# Patient Record
Sex: FEMALE
[De-identification: names, ages, dates, MRNs, and addresses within clinical notes are randomized per-mention and may not be internally consistent; named-entity substitution may affect disease eponyms.]

---

## 2024-08-28 ENCOUNTER — APPOINTMENT (OUTPATIENT)
Dept: INTERNAL MEDICINE | Facility: OTHER | Age: 69
End: 2024-08-28

## 2024-09-04 ENCOUNTER — OFFICE VISIT (OUTPATIENT)
Dept: INTERNAL MEDICINE | Facility: OTHER | Age: 69
End: 2024-09-04
Payer: MEDICARE

## 2024-09-04 VITALS
OXYGEN SATURATION: 97 % | HEART RATE: 71 BPM | DIASTOLIC BLOOD PRESSURE: 70 MMHG | BODY MASS INDEX: 23.32 KG/M2 | WEIGHT: 140 LBS | SYSTOLIC BLOOD PRESSURE: 130 MMHG | HEIGHT: 65 IN | TEMPERATURE: 96.8 F

## 2024-09-04 DIAGNOSIS — K21.9 GASTROESOPHAGEAL REFLUX DISEASE WITHOUT ESOPHAGITIS: ICD-10-CM

## 2024-09-04 DIAGNOSIS — R73.03 PREDIABETES: ICD-10-CM

## 2024-09-04 DIAGNOSIS — T78.40XS ALLERGY, SEQUELA: ICD-10-CM

## 2024-09-04 DIAGNOSIS — E03.9 HYPOTHYROIDISM, UNSPECIFIED TYPE: ICD-10-CM

## 2024-09-04 DIAGNOSIS — I10 HYPERTENSION, UNSPECIFIED TYPE: ICD-10-CM

## 2024-09-04 DIAGNOSIS — E78.5 DYSLIPIDEMIA: ICD-10-CM

## 2024-09-04 DIAGNOSIS — Z76.89 ENCOUNTER TO ESTABLISH CARE: ICD-10-CM

## 2024-09-04 DIAGNOSIS — G62.9 NEUROPATHY: ICD-10-CM

## 2024-09-04 DIAGNOSIS — F17.210 SMOKING GREATER THAN 20 PACK YEARS: Chronic | ICD-10-CM

## 2024-09-04 DIAGNOSIS — Z12.31 BREAST CANCER SCREENING BY MAMMOGRAM: ICD-10-CM

## 2024-09-04 DIAGNOSIS — F10.10 ALCOHOL ABUSE, DAILY USE: ICD-10-CM

## 2024-09-04 PROBLEM — T78.40XA ALLERGY: Status: ACTIVE | Noted: 2024-09-04

## 2024-09-04 PROCEDURE — 3078F DIAST BP <80 MM HG: CPT | Mod: GC

## 2024-09-04 PROCEDURE — 3075F SYST BP GE 130 - 139MM HG: CPT | Mod: GC

## 2024-09-04 PROCEDURE — 99204 OFFICE O/P NEW MOD 45 MIN: CPT | Mod: GC

## 2024-09-04 RX ORDER — DULOXETIN HYDROCHLORIDE 60 MG/1
60 CAPSULE, DELAYED RELEASE ORAL DAILY
COMMUNITY
End: 2024-09-10 | Stop reason: SDUPTHER

## 2024-09-04 RX ORDER — AMLODIPINE BESYLATE 5 MG/1
5 TABLET ORAL DAILY
COMMUNITY

## 2024-09-04 RX ORDER — LORATADINE 10 MG/1
10 TABLET ORAL DAILY
COMMUNITY

## 2024-09-04 RX ORDER — LOSARTAN POTASSIUM 100 MG/1
100 TABLET ORAL DAILY
COMMUNITY

## 2024-09-04 RX ORDER — CARVEDILOL 25 MG/1
25 TABLET ORAL 2 TIMES DAILY WITH MEALS
COMMUNITY

## 2024-09-04 RX ORDER — LEVOTHYROXINE SODIUM 25 UG/1
25 TABLET ORAL
COMMUNITY

## 2024-09-04 RX ORDER — ATORVASTATIN CALCIUM 20 MG/1
20 TABLET, FILM COATED ORAL NIGHTLY
COMMUNITY

## 2024-09-04 RX ORDER — PREGABALIN 50 MG/1
50 CAPSULE ORAL 2 TIMES DAILY
COMMUNITY

## 2024-09-04 RX ORDER — LEVOTHYROXINE SODIUM 200 UG/1
200 TABLET ORAL
COMMUNITY

## 2024-09-04 ASSESSMENT — PATIENT HEALTH QUESTIONNAIRE - PHQ9: CLINICAL INTERPRETATION OF PHQ2 SCORE: 0

## 2024-09-04 NOTE — PATIENT INSTRUCTIONS
Measure Blood pressure, write down, bring back in next visit  - Lab work  - Mammogram  - Waiting for medical record transfer from [facility] PCP

## 2024-09-04 NOTE — PROGRESS NOTES
Teaching Physician Attestation      Level of Participation    I have personally interviewed and examined the patient.  In addition, I discussed with the resident physician the patient's history, exam, assessment and plan in detail.  Topics listed in my addendum were the focus of the visit.  Healthcare maintenance was not addressed this visit unless listed as a topic in my addendum.  I agree with the plan as written along with the following additions/modifications:    New Patient    PMH: hypothyroidism, fam hx breast cancer (mother  at age 38), depression per chart, alcohol use, tobacco use, htn, hidradentits suppurativa previously followed by derm (reports completely resolved), gerd, neck LAD (neg US , patient reports stable, was told was benign),predaibetes, neuropathy likely 2/2 alcohol use at least in part    Peripheral neuropathy, likely secondary to alcohol use, may also have component of prior trauma to foot on right side, may also have a genetic component  Patient reports drinking 25 alcoholic beverages in a typical week.  She reports she has been evaluated in the past, reports multiple family members also have peripheral neuropathy.  Thus, may possibly be a genetic component.  Symptoms are predominantly in the feet.  She reports they are worse on the right side in the setting of a prior trauma to the right foot, although are bilateral.  Significantly improved on Lyrica.  Was previously prediabetic, does note drinking significant amounts of alcohol, she reports prior to lab work but is not sure if B12 was checked.  Does have history of hypothyroidism    Plan  -Check vitamin B12, A1c, also requesting records.  Continue Lyrica.  Checking TSH to monitor her thyroid status to prep for follow-up.  Assuming all values are normal, is almost certainly related in part due to her alcohol use.  Counseled the patient on this connection, and offered to assist in reducing her alcohol use at any time if she is  interested.  -Needs dedicated foot exam with tests of protective sensation at follow-up.  Called to follow-up after the visit, left a voicemail emphasizing the importance of daily foot exams and to call with any wounds that do not rapidly heal.    Nocturnal leg cramps  -Patient reports bilateral foot and calf cramping that is almost exclusively at night, at times is severe and prevents sleeping, rarely occurring during the day.  On exam calves appear grossly normal.    Plan  -Check electrolytes  -Counseled on hydration, minimum of 2 L/day of water.  Discussed how alcohol is a diuretic and can actually be counterproductive in hydration  -Calf stretching exercises discussed, handout will be delivered by Dr. Jerry.  -Follow-up in 1 month.  If electrolytes are normal and no improvement, can discuss other options    Tobacco use and high risk alcohol use  -Counseled on the risks related to alcohol use and tobacco use, offered to help patient quit at any time if she is interested.  Cautioned on driving while drinking.    History of breast cancer in mother  -Pts mother  at age 38 of breast cancer.  Her last mammogram was more than 2 years ago, has no current symptoms.  -Screening mammogram ordered, will follow-up after results.    Patient reports history of depression, no SI or HI, needs dedicated follow-up.      Rtc 1 month pcr.

## 2024-09-05 NOTE — PROGRESS NOTES
Chief Complaint   Patient presents with    Establish Care       HISTORY OF PRESENT ILLNESS: Patient is a 68 y.o. female with past medical history of hypothyroid, hypertension, dyslipidemia, allergy, neuropathy, GERD, depression and anxiety established patient who presents today for establishing primary care.  She and her  just moved to [place] from [facility] in  after half-way.  Requested to transfer for medical records from [facility] care and she agreed to it. Pending previous lab report, imagining report.    1. Hypothyroidism, unspecified type  Been taking levothyroxine 225 mcg, previously monitor and followed by her PCP, no endo specialist. Denied fatigue, weight gain, trouble tolerating cold, joint and muscle pain, dry skin or dry, thinning hair.     2. Hypertension, unspecified type  3. Dyslipidemia  Reported of losing 47 lb in 6 months after modifying her nutrition, diet, eating less carb, less sugar, more protein, less fast food, more home cook meals.But still cannot reduce smoking and alcohol. Drink 5-6 beers a day and smokes 1 pack a day.  She stated her BP has been well controlled, Home SBP ranging 120-130 mmHg. At today visit, initially was 144/76 and recheck manual BP was 130/70.   Been taking losartan, carvedilol, amlodipine for HTN and well tolerated  Taking Atorvastatin 20mg for dyslipidemia and well tolerated    4. Peripheral Neuropathy  Probably d/t alcohol abuse vs. B12 def ? vs. Electrolyte d/o?  5. Leg cramp  Had peripheral neuropathy ken in her legs for more than many years and relieved by pregabalin. Stated her other family members also have it and she thinks it might be genetic.   She has leg cramp at most night that wakes her up. Cramps were relieved after drinking pickle juice energy drink.    6. Prediabetes  She stated her last HbA1c was >6%    7. Breast cancer screening by mammogram  Her mother  from breast ca at age of 38. She stated her last  What Type Of Note Output Would You Prefer (Optional)?: Bullet Format Hpi Title: Evaluation of Skin Lesions mammogram 4 years ago was normal but she does not think she has breast ca gene so she does not recheck again but willing to undergo mammogram for now.      Past Medical History:   Diagnosis Date    Allergy     Dyslipidemia     GERD (gastroesophageal reflux disease)     Hypertension     Hypothyroid     Neuropathy        Patient Active Problem List    Diagnosis Date Noted    Allergy 09/04/2024       Penicillins    Current Outpatient Medications   Medication Sig Dispense Refill    levothyroxine (SYNTHROID) 25 MCG Tab Take 25 mcg by mouth every morning on an empty stomach.      loratadine (CLARITIN) 10 MG Tab Take 10 mg by mouth every day.      losartan (COZAAR) 100 MG Tab Take 100 mg by mouth every day.      esomeprazole (NEXIUM) 20 MG capsule Take 20 mg by mouth every morning before breakfast.      levothyroxine (SYNTHROID) 200 MCG Tab Take 200 mcg by mouth every morning on an empty stomach.      DULoxetine (CYMBALTA) 60 MG Cap DR Particles delayed-release capsule Take 60 mg by mouth every day.      carvedilol (COREG) 25 MG Tab Take 25 mg by mouth 2 times a day with meals.      NON SPECIFIED Take  by mouth every evening. Pickle juice for night cramps (lower body cramps)      atorvastatin (LIPITOR) 20 MG Tab Take 20 mg by mouth every evening.      pregabalin (LYRICA) 50 MG capsule Take 50 mg by mouth 2 times a day.      amLODIPine (NORVASC) 5 MG Tab Take 5 mg by mouth every day.       No current facility-administered medications for this visit.       Social History     Tobacco Use    Smoking status: Some Days     Current packs/day: 1.00     Average packs/day: 1 pack/day for 24.7 years (24.7 ttl pk-yrs)     Types: Cigarettes     Start date: 2000    Smokeless tobacco: Never   Vaping Use    Vaping status: Never Used   Substance Use Topics    Alcohol use: Yes     Alcohol/week: 15.0 oz     Types: 25 Cans of beer per week    Drug use: Not Currently       Family History   Problem Relation Age of Onset    Breast Cancer Mother  "38    Heart Disease Father 63    Other Sister 43        Hep C    Heart Failure Brother 62    Hypertension Brother     Drug abuse Brother          Review of Systems   Review of Systems   All other systems reviewed and are negative.      Exam:  /70 (BP Location: Left arm, Patient Position: Sitting)   Pulse 71   Temp 36 °C (96.8 °F) (Temporal)   Ht 1.651 m (5' 5\")   Wt 63.5 kg (140 lb)   SpO2 97%  Body mass index is 23.3 kg/m².    Constitutional:  Not in acute distress, well appearing.  HEENT:   NC/AT  Cardiovascular: Regular rate and rhythm. No murmurs or gallops.      Lungs:   Clear to auscultation bilaterally. No wheezes or crackles. No respiratory distress.  Abdomen: Not distended, soft, not tender. No guarding or rigidity. No masses.  Extremities:  No cyanosis/clubbing/edema. No obvious deformities.  Skin:  Warm and dry.  No visible rashes.  Neurologic: Alert & oriented x 3, CN II-XII grossly intact, strength and sensation grossly intact.  No focal deficits noted.  Psychiatric:  Affect normal, mood normal, judgment normal.    Assessment/Plan:     1. Hypothyroidism, unspecified type  - C/w levothyroxine 225mg  - TSH WITH REFLEX TO FT4; Future    2. Hypertension, unspecified type  Measure home BP, write down and report back in next visit  - Comp Metabolic Panel; Future  - MAGNESIUM; Future    3. Dyslipidemia  - C/w atorvastatin 20mg  - Lipid Profile; Future    4. Neuropathy  Counseled on hydration, at least of 2 L/day of water. Discussed how alcohol can cause neuropathy.  Calf stretching exercises discussed, recommend to look Youtube calf muscle exercises.  - VITAMIN B12; Future  - MAGNESIUM; Future    6. Prediabetes  - HEMOGLOBIN A1C; Future    7. Breast cancer screening by mammogram  - MA-SCREENING MAMMO BILAT W/TOMOSYNTHESIS W/CAD; Future    Discussed about smoking and alcohol abuse  - Counseled on risks and complications of long term abuses, offered to help to quit anytime if she is ready. She " How Severe Are Your Spot(S)?: moderate Have Your Spot(S) Been Treated In The Past?: has not been treated currently declined to talk about it.    All imaging results and lab results and consult notes are reviewed at this visit.  Followup: Return in about 5 weeks (around 10/10/2024), or if symptoms worsen or fail to improve.    Please note that this dictation was created using voice recognition software. I have made every reasonable attempt to correct obvious errors, but I expect that there are errors of grammar and possibly content that I did not discover before finalizing the note.        Nafisa Jerry MD  Internal Medicine PGY1      Additional History: Fbse

## 2024-09-09 ENCOUNTER — TELEPHONE (OUTPATIENT)
Dept: INTERNAL MEDICINE | Facility: OTHER | Age: 69
End: 2024-09-09
Payer: COMMERCIAL

## 2024-09-09 DIAGNOSIS — F32.0 CURRENT MILD EPISODE OF MAJOR DEPRESSIVE DISORDER, UNSPECIFIED WHETHER RECURRENT (HCC): ICD-10-CM

## 2024-09-10 RX ORDER — DULOXETIN HYDROCHLORIDE 60 MG/1
60 CAPSULE, DELAYED RELEASE ORAL DAILY
Qty: 30 CAPSULE | Refills: 1 | Status: SHIPPED | OUTPATIENT
Start: 2024-09-10

## 2024-09-10 NOTE — TELEPHONE ENCOUNTER
Received a voicemail from patient, requesting her medications.   Routing to Dr. Vargas as well, as he precepted appointment.     I called Loyda back to let her know that I have informed Dr. Vargas and Dr. Jerry of her message.

## 2024-09-11 NOTE — TELEPHONE ENCOUNTER
Phone Number Called: [phone]    Call outcome: Spoke to patient regarding message below.    Message: Informed patient that Dr. Jerry sent in her Duloxetine refill yesterday.   Closing encounter now.

## 2024-09-19 ENCOUNTER — HOSPITAL ENCOUNTER (OUTPATIENT)
Dept: RADIOLOGY | Facility: MEDICAL CENTER | Age: 69
End: 2024-09-19
Payer: COMMERCIAL

## 2024-09-30 ENCOUNTER — TELEPHONE (OUTPATIENT)
Dept: INTERNAL MEDICINE | Facility: OTHER | Age: 69
End: 2024-09-30
Payer: COMMERCIAL

## 2024-10-03 DIAGNOSIS — G62.9 NEUROPATHY: ICD-10-CM

## 2024-10-03 RX ORDER — PREGABALIN 50 MG/1
50 CAPSULE ORAL 2 TIMES DAILY
Qty: 90 CAPSULE | Status: CANCELLED | OUTPATIENT
Start: 2024-10-03

## 2024-10-03 NOTE — TELEPHONE ENCOUNTER
Received request via: Patient    Was the patient seen in the last year in this department? Yes    Does the patient have an active prescription (recently filled or refills available) for medication(s) requested?  no    Ph[facility] Name: [facility]    Does the patient have [facility] Plus and need 100-day supply? (This applies to ALL medications) Patient does not have SCP

## 2024-10-04 RX ORDER — LOSARTAN POTASSIUM 100 MG/1
100 TABLET ORAL DAILY
Qty: 30 TABLET | Refills: 2 | Status: SHIPPED | OUTPATIENT
Start: 2024-10-04

## 2024-10-04 RX ORDER — ATORVASTATIN CALCIUM 20 MG/1
20 TABLET, FILM COATED ORAL NIGHTLY
Qty: 30 TABLET | Refills: 2 | Status: SHIPPED | OUTPATIENT
Start: 2024-10-04

## 2024-10-04 RX ORDER — PREGABALIN 50 MG/1
50 CAPSULE ORAL 2 TIMES DAILY
Qty: 60 CAPSULE | Refills: 2 | Status: CANCELLED | OUTPATIENT
Start: 2024-10-04

## 2024-10-05 ENCOUNTER — HOSPITAL ENCOUNTER (OUTPATIENT)
Dept: LAB | Facility: MEDICAL CENTER | Age: 69
End: 2024-10-05
Payer: MEDICARE

## 2024-10-05 ENCOUNTER — APPOINTMENT (OUTPATIENT)
Dept: LAB | Facility: MEDICAL CENTER | Age: 69
End: 2024-10-05
Payer: MEDICARE

## 2024-10-05 DIAGNOSIS — E78.5 DYSLIPIDEMIA: ICD-10-CM

## 2024-10-05 DIAGNOSIS — I10 HYPERTENSION, UNSPECIFIED TYPE: ICD-10-CM

## 2024-10-05 DIAGNOSIS — E03.9 HYPOTHYROIDISM, UNSPECIFIED TYPE: ICD-10-CM

## 2024-10-05 DIAGNOSIS — R73.03 PREDIABETES: ICD-10-CM

## 2024-10-05 DIAGNOSIS — G62.9 NEUROPATHY: ICD-10-CM

## 2024-10-05 LAB
ALBUMIN SERPL BCP-MCNC: 4.3 G/DL (ref 3.2–4.9)
ALBUMIN/GLOB SERPL: 1.3 G/DL
ALP SERPL-CCNC: 83 U/L (ref 30–99)
ALT SERPL-CCNC: 42 U/L (ref 2–50)
ANION GAP SERPL CALC-SCNC: 14 MMOL/L (ref 7–16)
AST SERPL-CCNC: 40 U/L (ref 12–45)
BILIRUB SERPL-MCNC: 0.6 MG/DL (ref 0.1–1.5)
BUN SERPL-MCNC: 8 MG/DL (ref 8–22)
CALCIUM ALBUM COR SERPL-MCNC: 10 MG/DL (ref 8.5–10.5)
CALCIUM SERPL-MCNC: 10.2 MG/DL (ref 8.5–10.5)
CHLORIDE SERPL-SCNC: 100 MMOL/L (ref 96–112)
CHOLEST SERPL-MCNC: 149 MG/DL (ref 100–199)
CO2 SERPL-SCNC: 23 MMOL/L (ref 20–33)
CREAT SERPL-MCNC: 0.4 MG/DL (ref 0.5–1.4)
EST. AVERAGE GLUCOSE BLD GHB EST-MCNC: 105 MG/DL
GFR SERPLBLD CREATININE-BSD FMLA CKD-EPI: 107 ML/MIN/1.73 M 2
GLOBULIN SER CALC-MCNC: 3.4 G/DL (ref 1.9–3.5)
GLUCOSE SERPL-MCNC: 103 MG/DL (ref 65–99)
HBA1C MFR BLD: 5.3 % (ref 4–5.6)
HDLC SERPL-MCNC: 48 MG/DL
LDLC SERPL CALC-MCNC: 79 MG/DL
MAGNESIUM SERPL-MCNC: 1.7 MG/DL (ref 1.5–2.5)
POTASSIUM SERPL-SCNC: 4.1 MMOL/L (ref 3.6–5.5)
PROT SERPL-MCNC: 7.7 G/DL (ref 6–8.2)
SODIUM SERPL-SCNC: 137 MMOL/L (ref 135–145)
TRIGL SERPL-MCNC: 110 MG/DL (ref 0–149)

## 2024-10-05 PROCEDURE — 82607 VITAMIN B-12: CPT

## 2024-10-05 PROCEDURE — 84439 ASSAY OF FREE THYROXINE: CPT

## 2024-10-05 PROCEDURE — 83036 HEMOGLOBIN GLYCOSYLATED A1C: CPT | Mod: GA

## 2024-10-05 PROCEDURE — 36415 COLL VENOUS BLD VENIPUNCTURE: CPT

## 2024-10-05 PROCEDURE — 80053 COMPREHEN METABOLIC PANEL: CPT

## 2024-10-05 PROCEDURE — 80061 LIPID PANEL: CPT

## 2024-10-05 PROCEDURE — 83735 ASSAY OF MAGNESIUM: CPT

## 2024-10-05 PROCEDURE — 84443 ASSAY THYROID STIM HORMONE: CPT

## 2024-10-06 LAB
T4 FREE SERPL-MCNC: 1.81 NG/DL (ref 0.93–1.7)
TSH SERPL DL<=0.005 MIU/L-ACNC: <0.005 UIU/ML (ref 0.38–5.33)
VIT B12 SERPL-MCNC: 227 PG/ML (ref 211–911)

## 2024-10-07 RX ORDER — PREGABALIN 50 MG/1
50 CAPSULE ORAL 2 TIMES DAILY
Qty: 60 CAPSULE | Refills: 2 | Status: CANCELLED | OUTPATIENT
Start: 2024-10-07

## 2024-10-08 ENCOUNTER — HOSPITAL ENCOUNTER (OUTPATIENT)
Dept: RADIOLOGY | Facility: MEDICAL CENTER | Age: 69
End: 2024-10-08
Payer: MEDICARE

## 2024-10-08 ENCOUNTER — TELEPHONE (OUTPATIENT)
Dept: INTERNAL MEDICINE | Facility: OTHER | Age: 69
End: 2024-10-08

## 2024-10-08 DIAGNOSIS — Z12.31 BREAST CANCER SCREENING BY MAMMOGRAM: ICD-10-CM

## 2024-10-08 PROCEDURE — 77067 SCR MAMMO BI INCL CAD: CPT

## 2024-10-09 ENCOUNTER — APPOINTMENT (OUTPATIENT)
Dept: INTERNAL MEDICINE | Facility: OTHER | Age: 69
End: 2024-10-09
Payer: MEDICARE

## 2024-10-09 VITALS
HEART RATE: 75 BPM | BODY MASS INDEX: 23.06 KG/M2 | TEMPERATURE: 98.2 F | HEIGHT: 65 IN | OXYGEN SATURATION: 97 % | SYSTOLIC BLOOD PRESSURE: 122 MMHG | DIASTOLIC BLOOD PRESSURE: 66 MMHG | WEIGHT: 138.4 LBS

## 2024-10-09 DIAGNOSIS — Z13.820 SCREENING FOR OSTEOPOROSIS: ICD-10-CM

## 2024-10-09 DIAGNOSIS — F32.0 CURRENT MILD EPISODE OF MAJOR DEPRESSIVE DISORDER, UNSPECIFIED WHETHER RECURRENT (HCC): ICD-10-CM

## 2024-10-09 DIAGNOSIS — E03.9 HYPOTHYROIDISM, UNSPECIFIED TYPE: ICD-10-CM

## 2024-10-09 DIAGNOSIS — M81.8 OTHER OSTEOPOROSIS WITHOUT CURRENT PATHOLOGICAL FRACTURE: ICD-10-CM

## 2024-10-09 DIAGNOSIS — L98.9 SKIN LESION OF FACE: ICD-10-CM

## 2024-10-09 DIAGNOSIS — E05.90 HYPERTHYROIDISM: ICD-10-CM

## 2024-10-09 DIAGNOSIS — Z23 FLU VACCINE NEED: ICD-10-CM

## 2024-10-09 DIAGNOSIS — Z12.11 COLON CANCER SCREENING: ICD-10-CM

## 2024-10-09 DIAGNOSIS — Z85.828 HISTORY OF BASAL CELL CARCINOMA (BCC): ICD-10-CM

## 2024-10-09 PROBLEM — C44.91 BASAL CELL CARCINOMA (BCC): Status: ACTIVE | Noted: 2024-10-09

## 2024-10-09 PROCEDURE — G0008 ADMIN INFLUENZA VIRUS VAC: HCPCS | Mod: GC

## 2024-10-09 PROCEDURE — 90662 IIV NO PRSV INCREASED AG IM: CPT | Mod: GC

## 2024-10-09 PROCEDURE — 99214 OFFICE O/P EST MOD 30 MIN: CPT | Mod: 25,GC

## 2024-10-09 RX ORDER — DULOXETIN HYDROCHLORIDE 60 MG/1
60 CAPSULE, DELAYED RELEASE ORAL DAILY
Qty: 90 CAPSULE | Refills: 1 | Status: SHIPPED | OUTPATIENT
Start: 2024-10-09

## 2024-10-10 ENCOUNTER — TELEPHONE (OUTPATIENT)
Dept: INTERNAL MEDICINE | Facility: OTHER | Age: 69
End: 2024-10-10
Payer: COMMERCIAL

## 2024-10-10 DIAGNOSIS — G62.9 NEUROPATHY: ICD-10-CM

## 2024-10-17 ENCOUNTER — APPOINTMENT (OUTPATIENT)
Dept: RADIOLOGY | Facility: MEDICAL CENTER | Age: 69
End: 2024-10-17
Payer: MEDICARE

## 2024-10-21 ENCOUNTER — HOSPITAL ENCOUNTER (OUTPATIENT)
Dept: RADIOLOGY | Facility: MEDICAL CENTER | Age: 69
End: 2024-10-21
Payer: MEDICARE

## 2024-10-21 DIAGNOSIS — E05.90 HYPERTHYROIDISM: ICD-10-CM

## 2024-10-21 DIAGNOSIS — M81.8 OTHER OSTEOPOROSIS WITHOUT CURRENT PATHOLOGICAL FRACTURE: ICD-10-CM

## 2024-10-21 DIAGNOSIS — E03.9 HYPOTHYROIDISM, UNSPECIFIED TYPE: ICD-10-CM

## 2024-10-21 DIAGNOSIS — Z13.820 SCREENING FOR OSTEOPOROSIS: ICD-10-CM

## 2024-10-21 PROCEDURE — 77080 DXA BONE DENSITY AXIAL: CPT

## 2024-11-21 RX ORDER — LEVOTHYROXINE SODIUM 200 UG/1
200 TABLET ORAL
Qty: 90 TABLET | Refills: 2 | Status: SHIPPED | OUTPATIENT
Start: 2024-11-21

## 2024-11-21 NOTE — TELEPHONE ENCOUNTER
Received request via: Pharmacy    Was the patient seen in the last year in this department? Yes    Does the patient have an active prescription (recently filled or refills available) for medication(s) requested? No    Pharmacy Name: [facility]    Does the patient have [facility]-way Plus and need 100-day supply? (This applies to ALL medications) Patient does not have SCP

## 2024-12-24 ENCOUNTER — HOSPITAL ENCOUNTER (OUTPATIENT)
Dept: LAB | Facility: MEDICAL CENTER | Age: 69
End: 2024-12-24
Payer: MEDICARE

## 2024-12-24 DIAGNOSIS — E03.9 HYPOTHYROIDISM, UNSPECIFIED TYPE: ICD-10-CM

## 2024-12-24 LAB
ALBUMIN SERPL BCP-MCNC: 4.3 G/DL (ref 3.2–4.9)
ALBUMIN/GLOB SERPL: 1.3 G/DL
ALP SERPL-CCNC: 74 U/L (ref 30–99)
ALT SERPL-CCNC: 31 U/L (ref 2–50)
ANION GAP SERPL CALC-SCNC: 11 MMOL/L (ref 7–16)
AST SERPL-CCNC: 29 U/L (ref 12–45)
BILIRUB SERPL-MCNC: 0.3 MG/DL (ref 0.1–1.5)
BUN SERPL-MCNC: 11 MG/DL (ref 8–22)
CALCIUM ALBUM COR SERPL-MCNC: 8.9 MG/DL (ref 8.5–10.5)
CALCIUM SERPL-MCNC: 9.1 MG/DL (ref 8.5–10.5)
CHLORIDE SERPL-SCNC: 98 MMOL/L (ref 96–112)
CO2 SERPL-SCNC: 26 MMOL/L (ref 20–33)
CREAT SERPL-MCNC: 0.68 MG/DL (ref 0.5–1.4)
GFR SERPLBLD CREATININE-BSD FMLA CKD-EPI: 94 ML/MIN/1.73 M 2
GLOBULIN SER CALC-MCNC: 3.3 G/DL (ref 1.9–3.5)
GLUCOSE SERPL-MCNC: 78 MG/DL (ref 65–99)
POTASSIUM SERPL-SCNC: 5.1 MMOL/L (ref 3.6–5.5)
PROT SERPL-MCNC: 7.6 G/DL (ref 6–8.2)
SODIUM SERPL-SCNC: 135 MMOL/L (ref 135–145)
T4 FREE SERPL-MCNC: 1.33 NG/DL (ref 0.93–1.7)
TSH SERPL-ACNC: 0.18 UIU/ML (ref 0.35–5.5)

## 2024-12-24 PROCEDURE — 36415 COLL VENOUS BLD VENIPUNCTURE: CPT

## 2024-12-24 PROCEDURE — 84443 ASSAY THYROID STIM HORMONE: CPT

## 2024-12-24 PROCEDURE — 84439 ASSAY OF FREE THYROXINE: CPT

## 2024-12-24 PROCEDURE — 80053 COMPREHEN METABOLIC PANEL: CPT

## 2025-01-03 ENCOUNTER — OFFICE VISIT (OUTPATIENT)
Dept: INTERNAL MEDICINE | Facility: OTHER | Age: 70
End: 2025-01-03
Payer: MEDICARE

## 2025-01-03 VITALS
DIASTOLIC BLOOD PRESSURE: 59 MMHG | WEIGHT: 142 LBS | BODY MASS INDEX: 23.66 KG/M2 | OXYGEN SATURATION: 97 % | HEART RATE: 77 BPM | SYSTOLIC BLOOD PRESSURE: 115 MMHG | TEMPERATURE: 98 F | HEIGHT: 65 IN

## 2025-01-03 DIAGNOSIS — E03.9 HYPOTHYROIDISM, UNSPECIFIED TYPE: ICD-10-CM

## 2025-01-03 DIAGNOSIS — G47.00 INSOMNIA, UNSPECIFIED TYPE: ICD-10-CM

## 2025-01-03 PROCEDURE — 99214 OFFICE O/P EST MOD 30 MIN: CPT | Mod: GC

## 2025-01-03 PROCEDURE — 3074F SYST BP LT 130 MM HG: CPT | Mod: GC

## 2025-01-03 PROCEDURE — 3078F DIAST BP <80 MM HG: CPT | Mod: GC

## 2025-01-03 RX ORDER — LEVOTHYROXINE SODIUM 175 UG/1
175 TABLET ORAL
Qty: 90 TABLET | Refills: 1 | Status: SHIPPED | OUTPATIENT
Start: 2025-01-03

## 2025-01-03 RX ORDER — MULTIVITAMIN
CAPSULE ORAL
COMMUNITY

## 2025-01-03 ASSESSMENT — PATIENT HEALTH QUESTIONNAIRE - PHQ9: CLINICAL INTERPRETATION OF PHQ2 SCORE: 0

## 2025-01-03 NOTE — PROGRESS NOTES
Teaching Physician Attestation      Level of Participation    I have personally interviewed and examined the patient.  In addition, I discussed with the resident physician the patient's history, exam, assessment and plan in detail.  Topics listed in my addendum were the focus of the visit.  Healthcare maintenance was not addressed this visit unless listed as a topic in my addendum.  I agree with the plan as written along with the following additions/modifications:    Hypothyroidism  -some hair changes, otherwise no sx  -tsh 0.177, improved after last downtitration  -decrease to 175 mcg levothyroxine, tsh 6 weeks, f/u 7 weeks    sleep issues multifactorial secondary to stimulant use (tobacco), ruminating thoughts, and potentially nighttime urination  -Anxiety resources handout given with highlighted resources  -Discussed link between tobacco use and issues sleeping, patient considering quitting, in interim suggested last cigarette earlier in the day  -Drink last fluid earlier in the day  -Follow-up 6 weeks    Creatinine elevation  -No symptoms.  Encouraged increased hydration 2 to 3 L daily, repeat CMP.  Is on losartan.    Pcr.

## 2025-01-04 NOTE — PROGRESS NOTES
Established Patient    Patient Care Team:  Nafisa Jerry M.D. as PCP - General (Internal Medicine)    Loyda Sheehan is a 69 y.o. female with past medical history of hypothyroid, hypertension, dyslipidemia, allergy, neuropathy, GERD, depression and anxiety who presents today with the following Chief Complaint(s): Follow up for Diagnoses of Hypothyroidism, unspecified type and Insomnia, unspecified type were pertinent to this visit.    HPI:  1. Hypothyroidism, unspecified type  Her TSH is 0.177 but improved after lowering dose to 200mcg.  Denied any hyperthyroid symptoms.  Except thinning of hair which has been progressively worsened since August. This hair thinning timeline is coincide with her TSH level getting worsen.   Recommended her to try hair serum rosemary oil for now.   Denied increased heart rate, anxiety, constipation or diarrhea.    2. Insomnia, unspecified type  She stated she has difficulty initiating to sleep. Also when she woke up to go to bathroom, she has hard time to sleep back. So, she has to take nap in afternoon. She also stated her brain does not stop thinking about stuffs. Denied anxiety. She does smoke at least 4 cigarettes in evening.      ROS:     General: No fevers, chills, night sweats, weight loss or gain  HEENT: No hearing changes, vision changes, eye pain, ear pain, nasal discharge, sore throat  Neck: No swelling in neck  Pulmonary: No shortness of breath, cough, sputum, or hemoptysis  Cardiovascular: No chest pain, palpitations, or LE swelling  GI: No nausea, vomiting, diarrhea, constipation, abdominal pain, hematochezia or melena  : No dysuria or frequency  Neuro: No focal weakness, no general weakness, no headaches, no lightheadedness, no dizziness  Psych: No anxiety or depression    Past Medical History:   Diagnosis Date    Allergy     Dyslipidemia     GERD (gastroesophageal reflux disease)     Hypertension     Hypothyroid     Neuropathy      Social History     Tobacco Use     "Smoking status: Some Days     Current packs/day: 1.00     Average packs/day: 1 pack/day for 25.0 years (25.0 ttl pk-yrs)     Types: Cigarettes     Start date: 2000    Smokeless tobacco: Never    Tobacco comments:     1 pack daily    Vaping Use    Vaping status: Never Used   Substance Use Topics    Alcohol use: Yes     Alcohol/week: 15.0 oz     Types: 25 Cans of beer per week    Drug use: Not Currently     Current Outpatient Medications   Medication Sig Dispense Refill    Multiple Vitamin (MULTIVITAMIN) capsule Take  by mouth.      CALCIUM MAGNESIUM ZINC PO Take 10,000 mg by mouth every day. Brand _ Qunol      levothyroxine (SYNTHROID) 175 MCG Tab Take 1 Tablet by mouth every morning on an empty stomach. 90 Tablet 1    pregabalin (LYRICA) 50 MG capsule Take 1 Capsule by mouth 2 times a day for 90 days. 180 Capsule 0    DULoxetine (CYMBALTA) 60 MG Cap DR Particles delayed-release capsule Take 1 Capsule by mouth every day. 90 Capsule 1    losartan (COZAAR) 100 MG Tab Take 1 Tablet by mouth every day. 30 Tablet 2    loratadine (CLARITIN) 10 MG Tab Take 10 mg by mouth every day.      esomeprazole (NEXIUM) 20 MG capsule Take 20 mg by mouth every morning before breakfast.      carvedilol (COREG) 25 MG Tab Take 25 mg by mouth 2 times a day with meals.      amLODIPine (NORVASC) 5 MG Tab Take 5 mg by mouth every day.      atorvastatin (LIPITOR) 20 MG Tab Take 1 Tablet by mouth every evening. (Patient not taking: Reported on 1/3/2025) 30 Tablet 2     No current facility-administered medications for this visit.       Physical Exam:  /59 (BP Location: Left arm, Patient Position: Sitting, BP Cuff Size: Adult)   Pulse 77   Temp 36.7 °C (98 °F) (Temporal)   Ht 1.651 m (5' 5\")   Wt 64.4 kg (142 lb)   SpO2 97%   BMI 23.63 kg/m²   General: Well developed, well nourished female, in no distress.  HEENT: NC/AT, PERRL, EOMI, no scleral icterus or conjunctival pallor, fair dentition/denture in, no nasal discharge or oral " erythema or exudates.   Neck: Supple, No cervical or supraclavicular LAD  CV:RRR, no murmurs gallops or Rubs, no JVD  Pulm: LCAB, no crackles, rales, rhonchi, or wheezing  GI: Normal bowel sounds, abdomen soft, nontender, nondistended to deep or light palpation in all 4 quadrants, no HSM.  MSK: Radial and dorsalis pedis pulses 2+ and equal bilaterally, respectively.  Strength 5 out of 5 in upper and lower extremities.  No lower extremity edema  Neuro: Patient is alert and oriented x3, no focal deficits  Psych: Appropriate mood and affect       Assessment and Plan:   1. Hypothyroidism, unspecified type  Recommended to lower her synthroid to 175 mcg.  For her hair thinning problem, recommended to use rosemary oil. She does not have any dandruff or dry flaky scalp. This hair thinning could be due to unbalance thyroid problems. Recommend her to improve her thyroid level and monitor this. If hair thinning get worsen, she is recommended to see dermatologist in future.    - levothyroxine (SYNTHROID) 175 MCG Tab; Take 1 Tablet by mouth every morning on an empty stomach.  Dispense: 90 Tablet; Refill: 1  - Comp Metabolic Panel; Future  - TSH WITH REFLEX TO FT4; Future    2. Insomnia, unspecified type  This insomnia is probably due to smoking near bedtime. Educate her not to smoke after 3pm. She deferred to smoking cessation plan.      No problem-specific Assessment & Plan notes found for this encounter.       Return in about 7 weeks (around 2/21/2025).    Patient Instructions   -Do lab 1 week before next appointment      Nafisa Jerry M.D. PGY 1        This note was created using voice recognition software.  While every attempt is made to ensure accuracy of transcription, occasionally errors occur.

## 2025-01-13 ENCOUNTER — OFFICE VISIT (OUTPATIENT)
Dept: DERMATOLOGY | Facility: IMAGING CENTER | Age: 70
End: 2025-01-13
Payer: MEDICARE

## 2025-01-13 DIAGNOSIS — D18.01 CHERRY ANGIOMA: ICD-10-CM

## 2025-01-13 DIAGNOSIS — L82.1 SEBORRHEIC KERATOSES: ICD-10-CM

## 2025-01-13 DIAGNOSIS — Z12.83 SKIN CANCER SCREENING: ICD-10-CM

## 2025-01-13 DIAGNOSIS — D22.9 MULTIPLE BENIGN NEVI: ICD-10-CM

## 2025-01-13 DIAGNOSIS — D48.5 NEOPLASM OF UNCERTAIN BEHAVIOR OF SKIN: ICD-10-CM

## 2025-01-13 DIAGNOSIS — L81.4 LENTIGO: ICD-10-CM

## 2025-01-13 DIAGNOSIS — L57.0 ACTINIC KERATOSIS: ICD-10-CM

## 2025-01-13 PROBLEM — C44.91 BASAL CELL CARCINOMA (BCC): Status: RESOLVED | Noted: 2024-10-09 | Resolved: 2025-01-13

## 2025-01-13 PROCEDURE — 17003 DESTRUCT PREMALG LES 2-14: CPT | Performed by: STUDENT IN AN ORGANIZED HEALTH CARE EDUCATION/TRAINING PROGRAM

## 2025-01-13 PROCEDURE — 99203 OFFICE O/P NEW LOW 30 MIN: CPT | Mod: 25 | Performed by: STUDENT IN AN ORGANIZED HEALTH CARE EDUCATION/TRAINING PROGRAM

## 2025-01-13 PROCEDURE — 17000 DESTRUCT PREMALG LESION: CPT | Mod: 59 | Performed by: STUDENT IN AN ORGANIZED HEALTH CARE EDUCATION/TRAINING PROGRAM

## 2025-01-13 PROCEDURE — 11102 TANGNTL BX SKIN SINGLE LES: CPT | Performed by: STUDENT IN AN ORGANIZED HEALTH CARE EDUCATION/TRAINING PROGRAM

## 2025-01-13 NOTE — PROGRESS NOTES
[facility] DERMATOLOGY CLINIC NOTE    Chief Complaint   Patient presents with    Establish Care     Skin exam   Skin lesion of concern on nose x 2 years         HPI:    Loyda Sheehan is a 69 y.o. female here for evaluation of above, GUSTAVO.     Today notes following skin lesions of concern:     1) Skin growth on: nasal tip   Duration: ~5 years   Associated symptoms: flaking, redness   Prior treatments: none     No other symptomatic (itching, painful, burning) or changing lesions.       Dermatology History:        - Prior history of skin cancer: BCC right neck > 10 years ago        Review of Systems: No fevers, chill. Pertinent positives and negatives above.       Medications, Medical History, Surgical History, Family History & Allergies:  Reviewed in the chart, relevant history noted above.         PHYSICAL EXAM, ASSESSMENT, & PLAN (per problem):   A total body skin exam was performed including the following areas: head (including face), neck, chest, abdomen, groin/buttocks (excluding genitals), back, bilateral upper extremities, and bilateral lower extremities with the following pertinent findings in assessment/plan.      Neoplasm of uncertain significance, left thigh  Exam: pearly papule with crisp telangiectasias, ddx includes BCC vs other      - discussed options today, proceed with biopsy. Further treatment may be recommended     Shave Biopsy Procedure Note:   Risks, benefits and alternatives of procedure discussed, verbal consent obtained for photo (see chart) and informed consent obtained for procedure. Time out completed. Area of biopsy prepped with alcohol. Anesthesia with 1% lidocaine with epinephrine administered intradermally with 30 gauge needle. Shave biopsy of the site performed. Hemostasis achieved with pressure and aluminum chloride. Vaseline applied to wound with bandage. Patient tolerated procedure well and there were no complications. Wound care was discussed. Instructed to call clinic if patient  experiences any complications such as post-operative bleeding, infection.        Actinic Keratosis n=2  Exam: Gritty pink papules on let nasal tip, left upper cutaneous lip      Pre-cancerous nature of lesions and potential for progression to SCC if left untreated over time discussed.  Patient elects to proceed with LN2 destructive treatment today of largest/thickest lesions. Risks (including, but not limited to: hypo or hyperpigmentation, redness, blister, scar, recurrence) and benefits of cryotherapy discussed. PROCEDURE NOTE: 2 cryotherapy cycles x 10 seconds applied to 2 lesion/s in location as specified above. Aftercare instructions discussed.   If lesions do not heal or continue to grow, crust, bleed, etc, please call office sooner for re-evaluation.           Benign appearing melanocytic nevi   Exam: medium brown macules scattered on upper back, trunk consistent with junctional nevi    Discussed nevi are benign appearing on exam, symmetrical and regular pigment network on dermoscopy   ABCDEs of melanoma discussed, call office for sooner evaluation for concerning changes/growth      Lentigos / lentigines   Exam: scattered light to medium brown macules over photodistributed areas of face, upper shoulders, arms     benign, reassurance   ABCDEs of melanoma discussed       Seborrheic keratosis   Exam: scattered tan to brown verrucous papules and plaques on trunk and extremities     benign, reassurance       Cherry Angiomas   Exam: scattered 1-3mm bright red macules and thin papules on trunk and extremities     benign, reassurance       Skin Cancer Prevention Counseling   Advise regular sun protection/sunscreen use, SPF 30 or greater with broad spectrum coverage need for reapplication every  minutes.   Recommend broad brimmed hats, UPF sun protective clothing when outdoors for extended periods of time   Recommend self checks at home,  ABCDEs of melanoma discussed           Follow up: in 1 year for GUSTAVO, sooner  as needed       Rox Blake MD   [facility]

## 2025-01-16 ENCOUNTER — TELEPHONE (OUTPATIENT)
Dept: DERMATOLOGY | Facility: IMAGING CENTER | Age: 70
End: 2025-01-16
Payer: MEDICARE

## 2025-01-16 DIAGNOSIS — G62.9 NEUROPATHY: ICD-10-CM

## 2025-01-16 NOTE — TELEPHONE ENCOUNTER
Received request via: Pharmacy    Was the patient seen in the last year in this department? Yes    Does the patient have an active prescription (recently filled or refills available) for medication(s) requested? No    Pharmacy Name: [id number] - [place], [place] - [address] [[id number]]     Does the patient have [facility] Plus and need 100-day supply? (This applies to ALL medications) Patient does not have SCP

## 2025-01-16 NOTE — TELEPHONE ENCOUNTER
Called patient to inform of results.     A.) left thigh - basal cell carcinoma, infiltrative type     Discussed risks and benefits of treatment options including WLE, ED&C and topical chemotherapies. Discussed treatment rates are very high with WLE and ED&C, are slightly lower for topical chemotherapies like Efudex, however still overall very successful. After discussion, agreeable to proceed with wide local excision.     Will schedule in clinic for procedure. Thankful for call and had no further questions.

## 2025-01-19 RX ORDER — PREGABALIN 50 MG/1
50 CAPSULE ORAL 2 TIMES DAILY
Qty: 180 CAPSULE | Refills: 0 | Status: SHIPPED | OUTPATIENT
Start: 2025-01-19 | End: 2025-04-19

## 2025-01-22 ENCOUNTER — OFFICE VISIT (OUTPATIENT)
Dept: DERMATOLOGY | Facility: IMAGING CENTER | Age: 70
End: 2025-01-22
Payer: MEDICARE

## 2025-01-22 VITALS
WEIGHT: 139 LBS | TEMPERATURE: 99 F | BODY MASS INDEX: 21.82 KG/M2 | DIASTOLIC BLOOD PRESSURE: 78 MMHG | SYSTOLIC BLOOD PRESSURE: 126 MMHG | HEIGHT: 67 IN

## 2025-01-22 DIAGNOSIS — C44.91 INFILTRATIVE BASAL CELL CARCINOMA: ICD-10-CM

## 2025-01-22 DIAGNOSIS — C44.719 BASAL CELL CARCINOMA (BCC) OF SKIN OF LEFT LOWER EXTREMITY INCLUDING HIP: ICD-10-CM

## 2025-01-22 PROCEDURE — 11602 EXC TR-EXT MAL+MARG 1.1-2 CM: CPT | Performed by: STUDENT IN AN ORGANIZED HEALTH CARE EDUCATION/TRAINING PROGRAM

## 2025-01-22 PROCEDURE — 3078F DIAST BP <80 MM HG: CPT | Performed by: STUDENT IN AN ORGANIZED HEALTH CARE EDUCATION/TRAINING PROGRAM

## 2025-01-22 PROCEDURE — 12032 INTMD RPR S/A/T/EXT 2.6-7.5: CPT | Performed by: STUDENT IN AN ORGANIZED HEALTH CARE EDUCATION/TRAINING PROGRAM

## 2025-01-22 PROCEDURE — 3074F SYST BP LT 130 MM HG: CPT | Performed by: STUDENT IN AN ORGANIZED HEALTH CARE EDUCATION/TRAINING PROGRAM

## 2025-01-22 NOTE — PROGRESS NOTES
"PROCEDURE NOTE:    MALIGNANT LESION - WIDE LOCAL EXCISION    After patient received diagnosis of basal cell carcinoma, nodular type, further management was discussed, including wide local excision vs radiation therapy vs curettage & electrodesiccation (C&D) vs topical creams vs cryotherapy. Patient opted for wide local excision. Risks, benefits and alternatives of procedure, including, but not limited to scar, bleeding, pain, infection, nerve damage, recurrence of tumor, failed surgery, and need for further surgery, were discussed and written informed consent obtained. Correct site was verified by patient and myself, and verbal time out completed.     Allergies reviewed: Yes  Pacemaker/defibrillator: No  Artificial joints: No  Antibiotics given: No  Blood thinners/anticoagulants: No    Pre-op diagnosis: BCC, infiltrative type   Post-op diagnosis: Same  Site:left mid thigh  Pre-op size: 0.7cm + 5mm margin = 1.7cm  Antibiotics: no    /78   Temp 37.2 °C (99 °F)   Ht 1.702 m (5' 7\")   Wt 63 kg (139 lb)     Procedure: Area of surgery was prepped with alcohol, marked with 5 mm margins, and with sterile marking pen. Anesthesia with 1% lidocaine with epinephrine administered with 30 gauge needle. The area was again cleaned with povidine-iodine swab. With sterile technique, a 15 blade scalpel was used to make a fusiform incision around the tumor to the level of the subcutaneous fat. The tumor was removed. Bleeding was minimal, and hemostasis was achieved with pressure, hyfrecation. Specimen was placed into biopsy container and sent to pathology by staff.    Intermediate Closure:  Buried vertical mattress sutures were placed with monocryl to close dead space. 4.0 prolene superficial running sutures were placed to approximate wound edge.  Vaseline applied to wound with bandage. Patient tolerated procedure well and there were no complications, blood loss was minimal.     Final wound size: 5.5cm    Bandage was placed " with vaseline, telfa, gauze and tape. Wound care was discussed with the patient, and written instructions were provided. Patient to return to clinic in 10-14 days for suture removal. Patient to call us if any problems or concerns with the procedure site arise prior to scheduled appointment.     Additional follow-up will be planned for 3 months for total skin exam    Rox Blake M.D.

## 2025-01-28 ENCOUNTER — PATIENT MESSAGE (OUTPATIENT)
Dept: DERMATOLOGY | Facility: IMAGING CENTER | Age: 70
End: 2025-01-28
Payer: MEDICARE

## 2025-02-05 ENCOUNTER — NON-PROVIDER VISIT (OUTPATIENT)
Dept: DERMATOLOGY | Facility: IMAGING CENTER | Age: 70
End: 2025-02-05
Payer: MEDICARE

## 2025-02-05 NOTE — PROGRESS NOTES
Loyda Sheehan is a 69 y.o. female here for a Non-Provider Visit for Suture Removal.    Sutures were placed by Dr. Blake on date: 01/22/2025  Skin is healed: Yes  Provider notified if skin is not healed, or if there is redness, heat, pain, or drainage from incision: N\A  Sutures removed.   Mastisol and steristips are placed: Yes    Advised to use emollient (vaseline, aquaphor, etc.) as needed, avoid peroxide and antibiotic ointment to reduce irritation.     Path report has been reviewed by provider.  Path report has reviewed with patient.

## 2025-02-28 ENCOUNTER — TELEPHONE (OUTPATIENT)
Dept: MEDICAL GROUP | Facility: CLINIC | Age: 70
End: 2025-02-28
Payer: MEDICARE

## 2025-02-28 NOTE — TELEPHONE ENCOUNTER
Received request via: Patient    Was the patient seen in the last year in this department? Yes    Does the patient have an active prescription (recently filled or refills available) for medication(s) requested? No    Pharm[facility] Name: [id number] - [place], [place] - [address] [facility]     Does the patient have group home Plus and need 100-day supply? (This applies to ALL medications) Patient does not have SCP

## 2025-02-28 NOTE — TELEPHONE ENCOUNTER
Needs medication for carvedilol 25mg 90 day supply twice a day. Says Dr Jerry has never prescribed this medication to her in the past.

## 2025-03-02 RX ORDER — CARVEDILOL 25 MG/1
25 TABLET ORAL 2 TIMES DAILY WITH MEALS
Qty: 60 TABLET | Refills: 1 | Status: SHIPPED | OUTPATIENT
Start: 2025-03-02

## 2025-03-22 ENCOUNTER — APPOINTMENT (OUTPATIENT)
Dept: LAB | Facility: MEDICAL CENTER | Age: 70
End: 2025-03-22
Payer: MEDICARE

## 2025-03-22 ENCOUNTER — HOSPITAL ENCOUNTER (OUTPATIENT)
Dept: LAB | Facility: MEDICAL CENTER | Age: 70
End: 2025-03-22
Payer: MEDICARE

## 2025-03-22 DIAGNOSIS — E03.9 HYPOTHYROIDISM, UNSPECIFIED TYPE: ICD-10-CM

## 2025-03-22 LAB
ALBUMIN SERPL BCP-MCNC: 4 G/DL (ref 3.2–4.9)
ALBUMIN/GLOB SERPL: 1.2 G/DL
ALP SERPL-CCNC: 74 U/L (ref 30–99)
ALT SERPL-CCNC: 33 U/L (ref 2–50)
ANION GAP SERPL CALC-SCNC: 13 MMOL/L (ref 7–16)
AST SERPL-CCNC: 38 U/L (ref 12–45)
BILIRUB SERPL-MCNC: 0.4 MG/DL (ref 0.1–1.5)
BUN SERPL-MCNC: 11 MG/DL (ref 8–22)
CALCIUM ALBUM COR SERPL-MCNC: 9.6 MG/DL (ref 8.5–10.5)
CALCIUM SERPL-MCNC: 9.6 MG/DL (ref 8.5–10.5)
CHLORIDE SERPL-SCNC: 101 MMOL/L (ref 96–112)
CO2 SERPL-SCNC: 24 MMOL/L (ref 20–33)
CREAT SERPL-MCNC: 0.62 MG/DL (ref 0.5–1.4)
GFR SERPLBLD CREATININE-BSD FMLA CKD-EPI: 96 ML/MIN/1.73 M 2
GLOBULIN SER CALC-MCNC: 3.3 G/DL (ref 1.9–3.5)
GLUCOSE SERPL-MCNC: 93 MG/DL (ref 65–99)
POTASSIUM SERPL-SCNC: 4.7 MMOL/L (ref 3.6–5.5)
PROT SERPL-MCNC: 7.3 G/DL (ref 6–8.2)
SODIUM SERPL-SCNC: 138 MMOL/L (ref 135–145)
T4 FREE SERPL-MCNC: 1.26 NG/DL (ref 0.93–1.7)
TSH SERPL DL<=0.005 MIU/L-ACNC: 7.87 UIU/ML (ref 0.38–5.33)

## 2025-03-22 PROCEDURE — 84443 ASSAY THYROID STIM HORMONE: CPT

## 2025-03-22 PROCEDURE — 80053 COMPREHEN METABOLIC PANEL: CPT

## 2025-03-22 PROCEDURE — 84439 ASSAY OF FREE THYROXINE: CPT

## 2025-03-22 PROCEDURE — 36415 COLL VENOUS BLD VENIPUNCTURE: CPT

## 2025-03-25 ENCOUNTER — TELEPHONE (OUTPATIENT)
Dept: INTERNAL MEDICINE | Facility: OTHER | Age: 70
End: 2025-03-25
Payer: MEDICARE

## 2025-03-25 ENCOUNTER — RESULTS FOLLOW-UP (OUTPATIENT)
Dept: INTERNAL MEDICINE | Facility: OTHER | Age: 70
End: 2025-03-25

## 2025-03-25 NOTE — TELEPHONE ENCOUNTER
Called [phone]  to discuss about Tsh lab  She did not . Left message to make sure to come on 3/28/25 which is schedule to see me.

## 2025-03-28 ENCOUNTER — APPOINTMENT (OUTPATIENT)
Dept: INTERNAL MEDICINE | Facility: OTHER | Age: 70
End: 2025-03-28
Payer: MEDICARE

## 2025-03-28 VITALS
BODY MASS INDEX: 21.79 KG/M2 | HEART RATE: 68 BPM | OXYGEN SATURATION: 90 % | SYSTOLIC BLOOD PRESSURE: 138 MMHG | WEIGHT: 138.8 LBS | HEIGHT: 67 IN | DIASTOLIC BLOOD PRESSURE: 62 MMHG | TEMPERATURE: 98.4 F

## 2025-03-28 DIAGNOSIS — R06.02 SHORTNESS OF BREATH: ICD-10-CM

## 2025-03-28 DIAGNOSIS — E03.9 HYPOTHYROIDISM, UNSPECIFIED TYPE: ICD-10-CM

## 2025-03-28 DIAGNOSIS — J96.11 CHRONIC HYPOXIC RESPIRATORY FAILURE (HCC): ICD-10-CM

## 2025-03-28 DIAGNOSIS — F17.210 SMOKING GREATER THAN 40 PACK YEARS: ICD-10-CM

## 2025-03-28 RX ORDER — ALBUTEROL SULFATE 90 UG/1
2 INHALANT RESPIRATORY (INHALATION) EVERY 4 HOURS PRN
Qty: 1 EACH | Refills: 0 | Status: SHIPPED | OUTPATIENT
Start: 2025-03-28

## 2025-03-28 RX ORDER — TIOTROPIUM BROMIDE 18 UG/1
18 CAPSULE ORAL; RESPIRATORY (INHALATION) DAILY
Qty: 30 CAPSULE | Refills: 3 | Status: SHIPPED | OUTPATIENT
Start: 2025-03-28

## 2025-03-28 NOTE — PROGRESS NOTES
Teaching Physician Attestation      Level of Participation    I discussed with the resident physician the patient's history, exam, assessment and plan in detail.  Topics listed in my addendum were the focus of the visit.  Healthcare maintenance was not addressed this visit unless listed as a topic in my addendum.  I agree with plan as written along with the following additions/modifications:      Hypothyroidism, labile/difficult to control  -Patient establish care with a significantly over titrated TSH.  Reportedly no history of malignancy.  TSH has been stepwise down titrated from 250 mcg daily down to 175 daily.  Currently she reports loose stools and heat intolerance, no reported thyromegaly.  TSH is now mildly elevated at 7.8.  prior insomina also noted.    Plan  -Continue current Synthroid dose  -Repeat TSH along with additional thyroid antibody studies  -Endocrine referral in case needed, appreciate support  -Close follow-up after repeating TSH, if remains symptomatic and elevated would titrate further as needed      Shortness of breath and cough likely secondary to emphysema in the setting of chronic smoking use  -Chronic smoker, has chronic cough which has been worsening over the last month, also chronic dyspnea on exertion that has been worsening over the last month.  No chest pain reported.  On exam wheezing on the left side, prior chest x-ray shows changes consistent with emphysema and/or chronic bronchitis..  She is saturating 90% on room air    Plan  -Smoking cessation discussed  -Low-dose CT  -PFTs  -Begin Spiriva once daily, will need to stop a day or 2 before the PFTs for most accurate assessment  -Albuterol with spacer  -Alarm precautions for chest pain or shortness of breath  -Patient left before being seen, thus shortness of breath was not fully assessed.  Dr. Braxton called to further triageWould have like to ideally obtain EKG to ensure no anginal equivalent given cardiac risk factors, although  history of cough, progressive shortness of breath, combined with chest x-ray and physical exam wheeze makes COPD more likely return to clinic 1 month.  PCR    Return to clinic 1 month.  PCR

## 2025-03-29 NOTE — PROGRESS NOTES
Established Patient    Patient Care Team:  Nafisa Jerry M.D. as PCP - General (Internal Medicine)    Loyda Sheehan is a 69 y.o. female with past medical history of hypothyroid, hypertension, dyslipidemia, allergy, neuropathy, GERD, depression and anxiety who presents today with the following Chief Complaint(s): Follow up for Diagnoses of Hypothyroidism, unspecified type, Smoking greater than 40 pack years, and Shortness of breath were pertinent to this visit.    HPI:  1. Hypothyroidism, unspecified type  Her TSH showed 7.87 which increased from 0.177. T4 is 1.26. She has been taking 175 mcg levothyroxine.  Within 1 month, she reports loose stools 2times a day and heat intolerance, no reported thyromegaly.  No palpitation, no headache. Thinnnig of hair present. No recent loss of weight    2. Smoking greater than 40 pack years  3. Shortness of breath    Within a month, she starts to have sob and described that she has to remind herself to take a deep breath.  A week ago, she reduced her cigarette to 0.5 pack from 1 pack.    She can lie flat, no pitting edema b/l, no JVD, no orthopnea. No PND    The patient is a chronic smoker with a longstanding cough that has worsened over the past month, along with chronic exertional dyspnea that has also progressively worsened. No chest pain is reported. On examination, wheezing is noted on the left side. A prior chest X-ray showed findings consistent with emphysema and/or chronic bronchitis. Oxygen saturation is 90% on room air.    ROS:     General: No fevers, chills, night sweats, weight loss or gain  HEENT: No hearing changes, vision changes, eye pain, ear pain, nasal discharge, sore throat  Neck: No swelling in neck  Pulmonary: No shortness of breath, cough, sputum, or hemoptysis  Cardiovascular: No chest pain, palpitations, or LE swelling  GI: No nausea, vomiting, diarrhea, constipation, abdominal pain, hematochezia or melena  : No dysuria or frequency  Neuro: No focal  weakness, no general weakness, no headaches, no lightheadedness, no dizziness  Psych: No anxiety or depression    Past Medical History:   Diagnosis Date    Allergy     Dyslipidemia     GERD (gastroesophageal reflux disease)     Hypertension     Hypothyroid     Neuropathy      Social History     Tobacco Use    Smoking status: Some Days     Current packs/day: 1.00     Average packs/day: 0.9 packs/day for 52.2 years (45.7 ttl pk-yrs)     Types: Cigarettes     Start date: 1973    Smokeless tobacco: Never    Tobacco comments:     1 pack daily    Vaping Use    Vaping status: Never Used   Substance Use Topics    Alcohol use: Yes     Alcohol/week: 15.0 oz     Types: 25 Cans of beer per week     Comment: every other day beer    Drug use: Not Currently     Current Outpatient Medications   Medication Sig Dispense Refill    carvedilol (COREG) 25 MG Tab Take 1 Tablet by mouth 2 times a day with meals. 60 Tablet 1    pregabalin (LYRICA) 50 MG capsule Take 1 Capsule by mouth 2 times a day for 90 days. 180 Capsule 0    Multiple Vitamin (MULTIVITAMIN) capsule Take  by mouth.      CALCIUM MAGNESIUM ZINC PO Take 10,000 mg by mouth every day. Brand _ Qunol      levothyroxine (SYNTHROID) 175 MCG Tab Take 1 Tablet by mouth every morning on an empty stomach. 90 Tablet 1    DULoxetine (CYMBALTA) 60 MG Cap DR Particles delayed-release capsule Take 1 Capsule by mouth every day. 90 Capsule 1    losartan (COZAAR) 100 MG Tab Take 1 Tablet by mouth every day. 30 Tablet 2    loratadine (CLARITIN) 10 MG Tab Take 10 mg by mouth every day.      esomeprazole (NEXIUM) 20 MG capsule Take 20 mg by mouth every morning before breakfast.      amLODIPine (NORVASC) 5 MG Tab Take 5 mg by mouth every day.      atorvastatin (LIPITOR) 20 MG Tab Take 1 Tablet by mouth every evening. (Patient not taking: Reported on 3/28/2025) 30 Tablet 2     No current facility-administered medications for this visit.       Physical Exam:  /62 (BP Location: Left arm,  "Patient Position: Sitting, BP Cuff Size: Small adult)   Pulse 68   Temp 36.9 °C (98.4 °F) (Temporal)   Ht 1.702 m (5' 7\")   Wt 63 kg (138 lb 12.8 oz)   SpO2 90%   BMI 21.74 kg/m²   General: Well developed, well nourished female, in no distress.  HEENT: NC/AT, PERRL, EOMI, no scleral icterus or conjunctival pallor, fair dentition/denture in, no nasal discharge or oral erythema or exudates.   Neck: Supple, No cervical or supraclavicular LAD  CV:RRR, no murmurs gallops or Rubs, no JVD  Pulm: LCAB, no crackles, rales, rhonchi. Wheezing present on left side  GI: Normal bowel sounds, abdomen soft, nontender, nondistended to deep or light palpation in all 4 quadrants, no HSM.  MSK: Radial and dorsalis pedis pulses 2+ and equal bilaterally, respectively.  Strength 5 out of 5 in upper and lower extremities.  No lower extremity edema  Neuro: Patient is alert and oriented x3, no focal deficits  Psych: Appropriate mood and affect       Assessment and Plan:   1. Hypothyroidism, unspecified type  C/w levothyroxine 175 mcg  Will recheck full thyroid panel and follow on next week  Will refer to endocrine for further evaluation    - T3 FREE; Future  - THYROID PEROXIDASE  (TPO) AB; Future  - ANTITHYROGLOBULIN AB; Future  - TSH RECEPTOR ANTIBODY; Future  - TSH WITH REFLEX TO FT4; Future  - Referral to Endocrinology    2. Smoking greater than 40 pack years  3. Shortness of breath  She is most likely to have COPD & emphysema. Wheezing heard ken on left lung  She is 90% saturing on RA, does not use any inhaler. Never address this SOB before.    - Discussed smoking cessation.    - Ordered low-dose CT scan.    - Planned pulmonary function tests (PFTs); advised stopping Spiriva a day or two before testing for accurate results.    - Prescribed Spiriva once daily.    - Provided Albuterol with a spacer.    - Advised on alarm precautions for chest pain or worsening shortness of breath.    - Patient left before evaluation with Dr." Alicia    - REFERRAL TO LUNG CANCER SCREENING PROGRAM  - CT-HIGH RISK LUNG CANCER-SCREENING; Future  - PULMONARY FUNCTION TESTS -Test requested: Complete Pulmonary Function Test; Future    We called her phone number but she does not . Will try to call again tmw. [facility]      There are no Patient Instructions on file for this visit.    Nafisa Jerry M.D. PGY         This note was created using voice recognition software.  While every attempt is made to ensure accuracy of transcription, occasionally errors occur.

## 2025-03-31 NOTE — Clinical Note
REFERRAL APPROVAL NOTICE         Sent on March 31, 2025                   Loyda Sheehan  [address] [place] [id number]                   Dear Ms. Sheehan,    After a careful review of the medical information and benefit coverage, [facility] has processed your referral. See below for additional details.    If applicable, you must be actively enrolled with your insurance for coverage of the authorized service. If you have any questions regarding your coverage, please contact your insurance directly.    REFERRAL INFORMATION   Referral #:  [id number]  Referred-To Department    Referred-By Provider:  Pulmonary and Sleep Medicine    Nafisa Jerry M.D.   Pulmonary Rehab [facility]      [address]  [facility] [address]  [phone] [address]  [facility] [address]  [phone]    Referral Start Date:  03/28/2025  Referral End Date:   03/28/2026             SCHEDULING  If you do not already have an appointment, please call [phone] to make an appointment.     MORE INFORMATION  If you do not already have a [facility] account, sign up at: [email or web address]  You can access your medical information, make appointments, see lab results, billing information, and more.  If you have questions regarding this referral, please contact  the [facility] Referrals department at:             [phone]. Monday - Friday 8:00AM - 5:00PM.     Sincerely,    [facility]

## 2025-03-31 NOTE — Clinical Note
REFERRAL APPROVAL NOTICE         Sent on March 31, 2025                   Loyda Sheehan  [address] [place] [id number]                   Dear Ms. Sheehan,    After a careful review of the medical information and benefit coverage, [facility] has processed your referral. See below for additional details.    If applicable, you must be actively enrolled with your insurance for coverage of the authorized service. If you have any questions regarding your coverage, please contact your insurance directly.    REFERRAL INFORMATION   Referral #:  [id number]  Referred-To Department    Referred-By Provider:  Endocrinology    Nafisa Jerry M.D.   Endocrinology [facility]      [address]  [facility] [id number] [phone] [address]  [facility] [address] [phone]    Referral Start Date:  03/28/2025  Referral End Date:   03/28/2026           SCHEDULING  If you do not already have an appointment, please call [phone] to make an appointment.   MORE INFORMATION  As a reminder, [facility] ownership has changed, meaning this location is now owned and operated by [facility]. As such, we want to clarify that our patients should expect to receive two separate bills for the services received at [facility] - one representing the [facility] facility fees as the owner of the establishment, and the other to represent the physician's services and subsequent fees. You can speak with your insurance carrier for a pricing estimate by calling the customer service number on the back of your card and ask about charges for a hospital outpatient visit.  If you do not already have a [facility] account, sign up at: [email or web address]  You can access your medical information, make appointments, see lab results, billing information, and more.  If you have questions regarding this referral, please contact  the [facility] Referrals department at:             [phone]. Monday - Friday  7:30AM - 5:00PM.      Sincerely,  [facility]

## 2025-04-01 ENCOUNTER — HOSPITAL ENCOUNTER (OUTPATIENT)
Dept: LAB | Facility: MEDICAL CENTER | Age: 70
End: 2025-04-01
Payer: MEDICARE

## 2025-04-01 ENCOUNTER — TELEPHONE (OUTPATIENT)
Dept: INTERNAL MEDICINE | Facility: OTHER | Age: 70
End: 2025-04-01
Payer: MEDICARE

## 2025-04-01 DIAGNOSIS — E03.9 HYPOTHYROIDISM, UNSPECIFIED TYPE: ICD-10-CM

## 2025-04-01 LAB
T3FREE SERPL-MCNC: 2.48 PG/ML (ref 2–4.4)
THYROPEROXIDASE AB SERPL-ACNC: 153 IU/ML (ref 0–9)
TSH SERPL DL<=0.005 MIU/L-ACNC: 2.23 UIU/ML (ref 0.38–5.33)

## 2025-04-01 PROCEDURE — 84443 ASSAY THYROID STIM HORMONE: CPT

## 2025-04-01 PROCEDURE — 83520 IMMUNOASSAY QUANT NOS NONAB: CPT

## 2025-04-01 PROCEDURE — 84481 FREE ASSAY (FT-3): CPT

## 2025-04-01 PROCEDURE — 36415 COLL VENOUS BLD VENIPUNCTURE: CPT

## 2025-04-01 PROCEDURE — 86800 THYROGLOBULIN ANTIBODY: CPT

## 2025-04-01 PROCEDURE — 86376 MICROSOMAL ANTIBODY EACH: CPT

## 2025-04-01 NOTE — TELEPHONE ENCOUNTER
Called Loyda [phone]   After confirming her identification, we talked about the plan from last Friday.    She was recommended to  her inhalers from pharmacy for her SOB and wheezing    She was recommended to do her lab as early as possible and will call regarding lab result whether it needs to be seen in clinic if there is any big changes present.    I also explained to her to make appt for lung cancer screening, endocrinologist, PFT.    She understood and agreed to do those tasks.

## 2025-04-02 ENCOUNTER — TELEPHONE (OUTPATIENT)
Dept: INTERNAL MEDICINE | Facility: OTHER | Age: 70
End: 2025-04-02
Payer: MEDICARE

## 2025-04-02 NOTE — TELEPHONE ENCOUNTER
Called [phone] and Loyda Saini picked up phone and confirmed her identification.  Discussed about her recent lab result done on 4/1/2025  TSH 2.23, t3 2.48 and she is still taking levothyroxine 175 mcg.  Recommended her to still continue taking same 175 mcg dose.  The lab done on 3/22/2025 was 7.870 which possibly could be lab error and discussed that.  She said she has been doing great, no chest pain.

## 2025-04-03 LAB
THYROGLOB AB SERPL-ACNC: 329 IU/ML (ref 0–4)
TSH RECEP AB SER-ACNC: <1.1 IU/L

## 2025-04-14 DIAGNOSIS — F32.0 CURRENT MILD EPISODE OF MAJOR DEPRESSIVE DISORDER, UNSPECIFIED WHETHER RECURRENT (HCC): ICD-10-CM

## 2025-04-14 RX ORDER — LOSARTAN POTASSIUM 100 MG/1
100 TABLET ORAL
Qty: 30 TABLET | Refills: 2 | Status: SHIPPED | OUTPATIENT
Start: 2025-04-14

## 2025-04-14 RX ORDER — DULOXETIN HYDROCHLORIDE 60 MG/1
60 CAPSULE, DELAYED RELEASE ORAL
Qty: 90 CAPSULE | Refills: 1 | Status: SHIPPED | OUTPATIENT
Start: 2025-04-14

## 2025-04-14 NOTE — TELEPHONE ENCOUNTER
Received request via: Pharmacy    Was the patient seen in the last year in this department? Yes    Does the patient have an active prescription (recently filled or refills available) for medication(s) requested? No    Pharmacy Name: [id number] - [place], [place] - [address]      Does the patient have custodial Plus and need 100-day supply? (This applies to ALL medications) Patient does not have SCP

## 2025-04-16 ENCOUNTER — TELEPHONE (OUTPATIENT)
Dept: INTERNAL MEDICINE | Facility: OTHER | Age: 70
End: 2025-04-16
Payer: MEDICARE

## 2025-04-18 DIAGNOSIS — I10 HYPERTENSION, UNSPECIFIED TYPE: ICD-10-CM

## 2025-04-18 DIAGNOSIS — G62.9 NEUROPATHY: ICD-10-CM

## 2025-04-18 NOTE — TELEPHONE ENCOUNTER
Received request via: Patient    Was the patient seen in the last year in this department? Yes    Does the patient have an active prescription (recently filled or refills available) for medication(s) requested? No    [facility]    Does the patient have USP Plus and need 100-day supply? (This applies to ALL medications) Patient does not have SCP

## 2025-04-18 NOTE — TELEPHONE ENCOUNTER
Received request via: Pharmacy    Was the patient seen in the last year in this department? Yes    Does the patient have an active prescription (recently filled or refills available) for medication(s) requested? No    [facility]    Does the patient have correction Plus and need 100-day supply? (This applies to ALL medications) Patient does not have SCP

## 2025-04-22 RX ORDER — PREGABALIN 50 MG/1
50 CAPSULE ORAL 2 TIMES DAILY
Qty: 60 CAPSULE | Refills: 0 | Status: SHIPPED | OUTPATIENT
Start: 2025-04-22 | End: 2025-05-22

## 2025-04-22 RX ORDER — AMLODIPINE BESYLATE 5 MG/1
5 TABLET ORAL DAILY
Qty: 100 TABLET | Refills: 1 | Status: SHIPPED | OUTPATIENT
Start: 2025-04-22

## 2025-04-22 NOTE — TELEPHONE ENCOUNTER
Her last PCP was refilling amlodipine so far.  - Patient reports that she has not missed a dose now is currently running out and would like a refill .  Refill sent for amlodipine.  - Patient also brings up pregabalin, reviewing chart the request was sent on the 16th.  - Will reach out to Dr. Jerry regarding placing the refill today

## 2025-05-07 RX ORDER — CARVEDILOL 25 MG/1
25 TABLET ORAL 2 TIMES DAILY WITH MEALS
Qty: 60 TABLET | Refills: 1 | Status: SHIPPED | OUTPATIENT
Start: 2025-05-07

## 2025-05-07 NOTE — TELEPHONE ENCOUNTER
Received request via: Pharmacy    Was the patient seen in the last year in this department? Yes    Does the patient have an active prescription (recently filled or refills available) for medication(s) requested? No    Pharmacy Name: [id number] - [place], [place] - [address] [facility]     Does the patient have FDC Plus and need 100-day supply? (This applies to ALL medications) Patient does not have SCP

## 2025-05-13 ENCOUNTER — HOSPITAL ENCOUNTER (OUTPATIENT)
Dept: PULMONOLOGY | Facility: MEDICAL CENTER | Age: 70
End: 2025-05-13
Payer: MEDICARE

## 2025-05-13 DIAGNOSIS — R06.02 SHORTNESS OF BREATH: ICD-10-CM

## 2025-05-13 DIAGNOSIS — F17.210 SMOKING GREATER THAN 40 PACK YEARS: ICD-10-CM

## 2025-05-13 PROCEDURE — 94060 EVALUATION OF WHEEZING: CPT

## 2025-05-13 PROCEDURE — 94726 PLETHYSMOGRAPHY LUNG VOLUMES: CPT

## 2025-05-13 PROCEDURE — 94729 DIFFUSING CAPACITY: CPT

## 2025-05-13 RX ORDER — ALBUTEROL SULFATE 5 MG/ML
2.5 SOLUTION RESPIRATORY (INHALATION)
Status: DISCONTINUED | OUTPATIENT
Start: 2025-05-13 | End: 2025-05-14 | Stop reason: HOSPADM

## 2025-05-13 RX ADMIN — ALBUTEROL SULFATE 2.5 MG: 5 SOLUTION RESPIRATORY (INHALATION) at 10:50

## 2025-05-16 NOTE — PROCEDURES
DATE OF SERVICE:  05/13/2025     PULMONARY FUNCTION TEST INTERPRETATION     FINDINGS:  Baseline spirometry demonstrates a moderate obstruction.  There is   a negative bronchodilator response.     Flow volume loops are consistent with spirometric data.     Full lung volumes demonstrate severe air trapping.     The DLCO is normal.     SUMMARY:  There are findings consistent with COPD with air trapping.  The   patient should be advised to quit smoking.        ______________________________  DO NIMA CHAPA/CHUCHO    DD:  05/15/2025 20:03  DT:  05/15/2025 20:21    Job#:  [id number]

## 2025-05-20 DIAGNOSIS — G62.9 NEUROPATHY: ICD-10-CM

## 2025-05-20 NOTE — TELEPHONE ENCOUNTER
Received request via: Pharmacy    Was the patient seen in the last year in this department? Yes    Does the patient have an active prescription (recently filled or refills available) for medication(s) requested? No    Pharmacy Name: [id number] - [place], [place] - [address]      Does the patient have nursing home Plus and need 100-day supply? (This applies to ALL medications) Patient does not have SCP

## 2025-05-22 ENCOUNTER — OFFICE VISIT (OUTPATIENT)
Dept: ENDOCRINOLOGY | Facility: MEDICAL CENTER | Age: 70
End: 2025-05-22
Attending: STUDENT IN AN ORGANIZED HEALTH CARE EDUCATION/TRAINING PROGRAM
Payer: MEDICARE

## 2025-05-22 ENCOUNTER — RESULTS FOLLOW-UP (OUTPATIENT)
Dept: HOSPITALIST | Facility: MEDICAL CENTER | Age: 70
End: 2025-05-22
Payer: MEDICARE

## 2025-05-22 VITALS
HEART RATE: 71 BPM | BODY MASS INDEX: 22.03 KG/M2 | WEIGHT: 140.4 LBS | HEIGHT: 67 IN | SYSTOLIC BLOOD PRESSURE: 118 MMHG | OXYGEN SATURATION: 95 % | DIASTOLIC BLOOD PRESSURE: 60 MMHG

## 2025-05-22 DIAGNOSIS — E06.3 HYPOTHYROIDISM DUE TO HASHIMOTO THYROIDITIS: Primary | ICD-10-CM

## 2025-05-22 DIAGNOSIS — E03.9 HYPOTHYROIDISM (ACQUIRED): Primary | ICD-10-CM

## 2025-05-22 PROCEDURE — 3078F DIAST BP <80 MM HG: CPT | Performed by: STUDENT IN AN ORGANIZED HEALTH CARE EDUCATION/TRAINING PROGRAM

## 2025-05-22 PROCEDURE — 3074F SYST BP LT 130 MM HG: CPT | Performed by: STUDENT IN AN ORGANIZED HEALTH CARE EDUCATION/TRAINING PROGRAM

## 2025-05-22 PROCEDURE — 99212 OFFICE O/P EST SF 10 MIN: CPT | Performed by: STUDENT IN AN ORGANIZED HEALTH CARE EDUCATION/TRAINING PROGRAM

## 2025-05-22 PROCEDURE — 99203 OFFICE O/P NEW LOW 30 MIN: CPT | Performed by: STUDENT IN AN ORGANIZED HEALTH CARE EDUCATION/TRAINING PROGRAM

## 2025-05-22 NOTE — TELEPHONE ENCOUNTER
Contact to Loyda Sheehan [phone], verified with her .  Explained her PFT result which  showed copd, encourage smoking cessation.  Also rec to make appt for Ct lung for lung ca screening.    Rec to make appt with me, her PCP for monitoring and follow up. TSH lab ordered for follow up.

## 2025-05-22 NOTE — PROGRESS NOTES
New Patient Consult Note for Endocrinology  Referred by: Nafisa Jerry M.D.    Reason for consult:   Hypothyroidism    HPI:  Lodya Sheehan is a 69 y.o. female who was referred to endocrinology service for evaluation and management of hypothyroidism. Here with her  -     Hypothyroidism:  - was diagnosed about 25 years ago  - was on replacement therapy with LT4 (Synthroid, Levothyroxine)  - required multiple dose adjustments  - 1 year ago was 250 mcg -> now on 175 mcg/day  - takes medication in am with black coffee, at least 20 min before meals, takes Ca, Mg, PPIs in the evening  - admits being told she has celiac disease, denies diarrhea, states that was 10 years on gluten free diet but not any more    Current therapy:  Levothyroxine 175 mcg/day    Past Medical History:  Patient Active Problem List    Diagnosis Date Noted    Allergy 09/04/2024    Hypothyroidism 09/04/2024    Hypertension 09/04/2024    Dyslipidemia 09/04/2024    Neuropathy 09/04/2024    Gastroesophageal reflux disease without esophagitis 09/04/2024    Prediabetes 09/04/2024    Smoking greater than 40 pack years 09/04/2024    Alcohol abuse, daily use 09/04/2024     Past Surgical History:  Past Surgical History[1]    Allergies:  Penicillins    Social History:  Social History     Socioeconomic History    Marital status:      Spouse name: Not on file    Number of children: Not on file    Years of education: Not on file    Highest education level: Not on file   Occupational History    Not on file   Tobacco Use    Smoking status: Some Days     Current packs/day: 1.00     Average packs/day: 0.9 packs/day for 52.4 years (45.9 ttl pk-yrs)     Types: Cigarettes     Start date: 1973    Smokeless tobacco: Never    Tobacco comments:     1 pack daily    Vaping Use    Vaping status: Never Used   Substance and Sexual Activity    Alcohol use: Yes     Alcohol/week: 15.0 oz     Types: 25 Cans of beer per week     Comment: every other day beer    Drug use:  "Not Currently    Sexual activity: Yes     Partners: Male     Birth control/protection: None   Other Topics Concern    Not on file   Social History Narrative    Not on file     Social Drivers of Health     Financial Resource Strain: Not on file   Food Insecurity: Not on file   Transportation Needs: Not on file   Physical Activity: Not on file   Stress: Not on file   Social Connections: Not on file   Intimate Partner Violence: Not on file   Housing Stability: Not on file     Family History:  Family History   Problem Relation Age of Onset    Breast Cancer Mother 38    Heart Disease Father 63    Other Sister 43        Hep C    Heart Failure Brother 62    Hypertension Brother     Drug abuse Brother      Medications:  Current Medications[2]    Physical Examination:   Vital signs: /60   Pulse 71   Ht 1.702 m (5' 7\") Comment: pt stated  Wt 63.7 kg (140 lb 6.4 oz)   SpO2 95%   BMI 21.99 kg/m²   General: No distress, cooperative, well dressed and well nourished.   Eyes: No scleral icterus or discharge  ENMT: Normal on external inspection of nose, lips, No nasal drainage   Neck: No abnormal masses on inspection  Resp: Normal effort  CVS: Regular rate and rhythm  Extremities: No edema bilateral extremities  Neuro: Alert and oriented  Skin: No rash, No Ulcers  Psych: Normal mood and affect    Labs:  - reviewed   Reference Range 10/05/24  12/24/24 03/22/25  04/01/25    TSH 0.380 - 5.330 uIU/mL <0.005 (L) 0.177 (L) 7.870 (H) 2.230   fT4 0.93 - 1.70 ng/dL 1.81 (H) 1.33 1.26    fT3 2.00 - 4.40 pg/mL    2.48   TPO-Abs 0.0 - 9.0 IU/mL    153.0 (H)   Tg-Abs 0.0 - 4.0 IU/mL    329.0 (H)   TrAbs <=1.75 IU/L    <1.10   LT4  225 200 175 175     Imaging:  - reviewed  DEXA scan:  Date Machine L1- L4  BMD/ T-score LFN  BMD/ T-score FRAX Rx    10/21/2024           Assessment and Plan:        RTC:    Total time (face-to-face and non-face-to face time):  min - discussion of diagnoses, treatment, prognosis, medical charts, lab, " imaging, pathology review, documentation.      Plan reviewed with the patient and agreed with plan.  All questions answered to patient's satisfaction.  Thank you kindly for allowing me to participate in the care plan for this patient.    Sherin Saleem MD    CC:   Nafisa Jerry M.D.           [1]   Past Surgical History:  Procedure Laterality Date    ANKLE LIGAMENT RECONSTRUCTION      REPAIR, KNEE, ACL     [2]   Current Outpatient Medications:     carvedilol (COREG) 25 MG Tab, TAKE ONE TABLET BY MOUTH TWICE A DAY WITH MEALS, Disp: 60 Tablet, Rfl: 1    amLODIPine (NORVASC) 5 MG Tab, Take 1 Tablet by mouth every day., Disp: 100 Tablet, Rfl: 1    pregabalin (LYRICA) 50 MG capsule, Take 1 Capsule by mouth 2 times a day for 30 days., Disp: 60 Capsule, Rfl: 0    losartan (COZAAR) 100 MG Tab, TAKE ONE TABLET BY MOUTH EVERY DAY, Disp: 30 Tablet, Rfl: 2    DULoxetine (CYMBALTA) 60 MG Cap DR Particles delayed-release capsule, TAKE ONE CAPSULE BY MOUTH EVERY DAY, Disp: 90 Capsule, Rfl: 1    albuterol 108 (90 Base) MCG/ACT Aero Soln inhalation aerosol, Inhale 2 Puffs every four hours as needed for Shortness of Breath., Disp: 1 Each, Rfl: 0    Multiple Vitamin (MULTIVITAMIN) capsule, Take  by mouth., Disp: , Rfl:     CALCIUM MAGNESIUM ZINC PO, Take 10,000 mg by mouth every day. Brand _ Qunol, Disp: , Rfl:     levothyroxine (SYNTHROID) 175 MCG Tab, Take 1 Tablet by mouth every morning on an empty stomach., Disp: 90 Tablet, Rfl: 1    loratadine (CLARITIN) 10 MG Tab, Take 10 mg by mouth every day., Disp: , Rfl:     esomeprazole (NEXIUM) 20 MG capsule, Take 20 mg by mouth every morning before breakfast., Disp: , Rfl:     tiotropium (SPIRIVA HANDIHALER) 18 MCG Cap, Place 1 Capsule into inhaler and inhale every day., Disp: 30 Capsule, Rfl: 3    atorvastatin (LIPITOR) 20 MG Tab, Take 1 Tablet by mouth every evening. (Patient not taking: Reported on 3/28/2025), Disp: 30 Tablet, Rfl: 2

## 2025-05-23 RX ORDER — PREGABALIN 50 MG/1
CAPSULE ORAL
Qty: 60 CAPSULE | Refills: 0 | Status: SHIPPED | OUTPATIENT
Start: 2025-05-23 | End: 2025-06-22

## 2025-05-23 NOTE — TELEPHONE ENCOUNTER
Patient called and requested a follow up on this request. Requesting a refill asap nad expressed frustration. I informed patient I would route request to provider pool for assistance.    Thank you

## 2025-06-09 DIAGNOSIS — E03.9 HYPOTHYROIDISM, UNSPECIFIED TYPE: ICD-10-CM

## 2025-06-09 NOTE — TELEPHONE ENCOUNTER
Received request via: Pharmacy    Was the patient seen in the last year in this department? Yes    Does the patient have an active prescription (recently filled or refills available) for medication(s) requested? No    Pharmacy Name: [id number] - [place], [place] - [address]      Does the patient have correction Plus and need 100-day supply? (This applies to ALL medications) Patient does not have SCP

## 2025-06-13 ENCOUNTER — OFFICE VISIT (OUTPATIENT)
Dept: DERMATOLOGY | Facility: IMAGING CENTER | Age: 70
End: 2025-06-13
Payer: MEDICARE

## 2025-06-13 DIAGNOSIS — D48.5 NEOPLASM OF UNCERTAIN BEHAVIOR OF SKIN: Primary | ICD-10-CM

## 2025-06-13 PROCEDURE — 11102 TANGNTL BX SKIN SINGLE LES: CPT | Performed by: DERMATOLOGY

## 2025-06-13 RX ORDER — LEVOTHYROXINE SODIUM 175 UG/1
175 TABLET ORAL
Qty: 90 TABLET | Refills: 1 | Status: SHIPPED | OUTPATIENT
Start: 2025-06-13

## 2025-06-13 NOTE — TELEPHONE ENCOUNTER
Patient LVM, would like her medication refilled today. Would like a call back once completed. Refill was requested 6/9/25.

## 2025-06-13 NOTE — PROGRESS NOTES
CC: Skin Lesion     Subjective: New patient here for spot of concern on nose     HPI/location: Left nose  Time present: 4 years  Painful lesion: No  Itching lesion: No  Enlarging lesion: Yes  Anything make it better or worse?   Ln2 not effective to treat    History of skin cancer: Yes, Details: BCC right neck > 10 years ago    History of precancers/actinic keratoses: No  History of biopsies:Yes, Details: bcc  History of blistering/severe sunburns:Yes, Details: kid  Family history of skin cancer:No  Family history of atypical moles:No    ROS: no fevers/chills. No itch.  No cough   Relevant PMH:NC  Social: smoker    PE: Gen:WDWN female in NAD. 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 Cleaned with EtOH, anesthesia with lidocaine 1% + epinephrine, shave bx, AlCl3 for hemostasis  -vaseline/bandage and wound care reviewed        I have reviewed medications relevant to my specialty.

## 2025-06-19 DIAGNOSIS — C44.320 SCC (SQUAMOUS CELL CARCINOMA), FACE: Primary | ICD-10-CM

## 2025-06-19 NOTE — Clinical Note
REFERRAL APPROVAL NOTICE         Sent on June 19, 2025                   Loyda Sheehan  [address] [place] [address] [place] [address]                   Dear Ms. Sheehan,    After a careful review of the medical information and benefit coverage, [facility] has processed your referral. See below for additional details.    If applicable, you must be actively enrolled with your insurance for coverage of the authorized service. If you have any questions regarding your coverage, please contact your insurance directly.    REFERRAL INFORMATION   Referral #:  [id number]  Referred-To Department    Referred-By Provider:  Dermatology    Maryan Flynn M.D.   [facility]      [address]  [place] [address]  [phone] [address]  [place] [id number] [phone]    Referral Start Date:  06/19/2025  Referral End Date:   06/19/2026             SCHEDULING  If you do not already have an appointment, please call [phone] to make an appointment.     MORE INFORMATION  If you do not already have a [facility] account, sign up at: [email or web address]  You can access your medical information, make appointments, see lab results, billing information, and more.  If you have questions regarding this referral, please contact  the [facility] Referrals department at:             [phone]. Monday - Friday 8:00AM - 5:00PM.     Sincerely,    [facility]

## 2025-07-03 RX ORDER — CARVEDILOL 25 MG/1
25 TABLET ORAL 2 TIMES DAILY WITH MEALS
Qty: 60 TABLET | Refills: 1 | Status: SHIPPED | OUTPATIENT
Start: 2025-07-03

## 2025-07-03 NOTE — TELEPHONE ENCOUNTER
Received request via: Pharmacy    Was the patient seen in the last year in this department? Yes    Does the patient have an active prescription (recently filled or refills available) for medication(s) requested? No    Pharmacy Name: [id number] - [place], [place] - [address]     Does the patient have shelter Plus and need 100-day supply? (This applies to ALL medications) Patient does not have SCP

## 2025-07-10 ENCOUNTER — APPOINTMENT (OUTPATIENT)
Dept: URBAN - METROPOLITAN AREA CLINIC 22 | Facility: CLINIC | Age: 70
Setting detail: DERMATOLOGY
End: 2025-07-10

## 2025-07-10 PROBLEM — C44.321 SQUAMOUS CELL CARCINOMA OF SKIN OF NOSE: Status: ACTIVE | Noted: 2025-07-10

## 2025-07-10 PROCEDURE — ? REFERRAL CORRESPONDENCE

## 2025-07-10 PROCEDURE — ? MOHS SURGERY

## 2025-07-10 PROCEDURE — ? PRESCRIPTION

## 2025-07-10 RX ORDER — HYDROCODONE BITARTRATE AND ACETAMINOPHEN 5; 325 MG/1; MG/1
TABLET ORAL EVERY 6 HOURS PRN
Qty: 15 | Refills: 0 | Status: ERX | COMMUNITY
Start: 2025-07-10

## 2025-07-10 RX ORDER — DOXYCYCLINE MONOHYDRATE 100 MG/1
CAPSULE ORAL BID
Qty: 20 | Refills: 0 | Status: ERX | COMMUNITY
Start: 2025-07-10

## 2025-07-10 RX ADMIN — HYDROCODONE BITARTRATE AND ACETAMINOPHEN: 5; 325 TABLET ORAL at 00:00

## 2025-07-10 RX ADMIN — DOXYCYCLINE MONOHYDRATE: 100 CAPSULE ORAL at 00:00

## 2025-07-16 ENCOUNTER — HOSPITAL ENCOUNTER (OUTPATIENT)
Dept: LAB | Facility: MEDICAL CENTER | Age: 70
End: 2025-07-16
Attending: STUDENT IN AN ORGANIZED HEALTH CARE EDUCATION/TRAINING PROGRAM
Payer: MEDICARE

## 2025-07-16 DIAGNOSIS — E03.9 HYPOTHYROIDISM (ACQUIRED): ICD-10-CM

## 2025-07-16 LAB
T4 FREE SERPL-MCNC: 1.13 NG/DL (ref 0.93–1.7)
TSH SERPL-ACNC: 6.65 UIU/ML (ref 0.38–5.33)

## 2025-07-16 PROCEDURE — 86364 TISS TRNSGLTMNASE EA IG CLAS: CPT

## 2025-07-16 PROCEDURE — 86663 EPSTEIN-BARR ANTIBODY: CPT

## 2025-07-16 PROCEDURE — 84443 ASSAY THYROID STIM HORMONE: CPT

## 2025-07-16 PROCEDURE — 84439 ASSAY OF FREE THYROXINE: CPT

## 2025-07-16 PROCEDURE — 86664 EPSTEIN-BARR NUCLEAR ANTIGEN: CPT

## 2025-07-16 PROCEDURE — 36415 COLL VENOUS BLD VENIPUNCTURE: CPT

## 2025-07-16 PROCEDURE — 86665 EPSTEIN-BARR CAPSID VCA: CPT

## 2025-07-17 RX ORDER — LOSARTAN POTASSIUM 100 MG/1
100 TABLET ORAL
Qty: 30 TABLET | Refills: 2 | Status: SHIPPED | OUTPATIENT
Start: 2025-07-17

## 2025-07-17 NOTE — TELEPHONE ENCOUNTER
Received request via: Pharmacy    Was the patient seen in the last year in this department? Yes    Does the patient have an active prescription (recently filled or refills available) for medication(s) requested? No    Pharmacy Name: [id number] - [place], [place] - [address]     Does the patient have residential Plus and need 100-day supply? (This applies to ALL medications) Patient does not have SCP

## 2025-07-17 NOTE — PROGRESS NOTES
"Follow up Endocrinology Visit  Initial consult/last visit on: 5/22/25  Referred by: Nafisa Jerry M.D.    Patient was presented for a telehealth consultation via secure and encrypted videoconferencing technology.    Location of Provider - office  Location of Patient - [place], at home  Patient Consent - yes  Platform used - Zoom    Chief complaint:  Loyda Sheehan, 69 y.o., female, who is here for follow up for hypothyroidism management.    Interval history:   - recently had a surgery for SCC removal from her nose -> wearing the bandage  - she just recently started her gluten free diet  - she is now taking LT4 at least 1 hr before her coffee  - reviewed recent labs    Hypothyroidism:  - was diagnosed about 25 years ago  - was on replacement therapy with LT4 (Synthroid, Levothyroxine)  - required multiple dose adjustments  - 1 year ago was 250 mcg -> now on 175 mcg/day  - takes medication in am with black coffee, at least 20 min before meals, takes Ca, Mg, PPIs in the evening  - admits being told she has celiac disease, denies diarrhea, states that was 10 years on gluten free diet but not any more    Current therapy:  Levothyroxine 175 mcg/day    Physical Examination:   Vital signs: Ht 1.702 m (5' 7\")   Wt 62.6 kg (138 lb)   BMI 21.61 kg/m²   General: No distress, cooperative, well dressed and well nourished.   Resp: Normal effort  CVS: Regular rate and rhythm  Extremities: No edema bilateral extremities  Neuro: Alert and oriented  Skin: No rash, No Ulcers  Psych: Normal mood and affect    Labs:  - reviewed   Reference Range 10/05/24  12/24/24 03/22/25  04/01/25  7/16/25    TSH 0.380 - 5.330 uIU/mL <0.005 (L) 0.177 (L) 7.870 (H) 2.230 6.650 (H)    fT4 0.93 - 1.70 ng/dL 1.81 (H) 1.33 1.26  1.13    fT3 2.00 - 4.40 pg/mL    2.48     TPO-Abs 0.0 - 9.0 IU/mL    153.0 (H)     Tg-Abs 0.0 - 4.0 IU/mL    329.0 (H)     TrAbs <=1.75 IU/L    <1.10     T-TG-IgA 0.00 - 4.99 FLU      185.42 (H)    LT4  225 200 175 175 175 175 "     Imaging:  - reviewed  DEXA scan:  Date Machine L1- L4  BMD/ T-score LFN  BMD/ T-score FRAX Rx    10/21/2024   Solutionary Prodigy unit   0.992 g/cm2  / - 1.5  0.916 g/cm2 / - 0.7  18.5% / 4.9%      Assessment and Plan:  # Hypothyroidism (acquired) (Primary)  - chronic problem  - required multiple dose adjustment in a near past  - part of the problem - possible malabsorption of the medication due to celiac disease (work up is positive) and taking LT4 very close to coffee with milk  - currently TSH is slightly elevated while on LT4 175 mcg/day vs weight  base calculated dose ~ 100 mcg/day  - will keep current dose of LT4, patient will proceed with gluten free diet  - repeat TFTs in 3 mo, if TSH is still elevated -> increase dose of LT4  Plan:  Continue Levothyroxine 175 mcg/day  Repeat labs in 3 mo:  - FREE THYROXINE; Future  - TSH; Future    # Celiac disease  - resume gluten free diet    # Elevated FRAX score for the hip > 3%  - hip FRAX score - 4.9%  - pharmacologic osteoporosis treatment is indicated as the patient’s 10-year hip fracture risk exceeds the 3% FRAX threshold recommended by major guidelines, regardless of T-score  - given Hx of heartburn and celiac disease -> proceed with IV bisphophonate - Reclast 5 mg IV  - consider doing 24 hr urine Ca while on gluten free diet  Plan:  Continue vit D and Ca supplementation.   Start Reclast 5 mg IV annually.   Repeat DEXA scan in 10/2026  Obtain following labs:  - Comp Metabolic Panel; Future  - PHOSPHORUS; Future  - VITAMIN D,25 HYDROXY (DEFICIENCY); Future  - PTH INTACT (PTH ONLY); Future    Consider doing 24 hr urine Ca/creatnine    RTC: 3 mo  Total time - 50 min    Plan reviewed with the patient and agreed with plan.  All questions answered to patient's satisfaction.  Thank you kindly for allowing me to participate in the care plan for this patient.  Sherin Saleem MD    CC:   Nafisa Jerry M.D.

## 2025-07-18 LAB
EBV EA-D IGG SER-ACNC: >150 U/ML (ref 0–10.9)
EBV NA IGG SER IA-ACNC: >600 U/ML (ref 0–21.9)
EBV VCA IGG SER IA-ACNC: >750 U/ML (ref 0–21.9)
EBV VCA IGM SER IA-ACNC: 72.7 U/ML (ref 0–43.9)
TTG IGA SER IA-ACNC: 185.42 FLU (ref 0–4.99)

## 2025-07-22 ENCOUNTER — TELEMEDICINE (OUTPATIENT)
Dept: ENDOCRINOLOGY | Facility: MEDICAL CENTER | Age: 70
End: 2025-07-22
Attending: STUDENT IN AN ORGANIZED HEALTH CARE EDUCATION/TRAINING PROGRAM
Payer: MEDICARE

## 2025-07-22 ENCOUNTER — TELEPHONE (OUTPATIENT)
Dept: ENDOCRINOLOGY | Facility: MEDICAL CENTER | Age: 70
End: 2025-07-22
Payer: MEDICARE

## 2025-07-22 VITALS — WEIGHT: 138 LBS | HEIGHT: 67 IN | BODY MASS INDEX: 21.66 KG/M2

## 2025-07-22 DIAGNOSIS — Z91.89 FRACTURE RISK ASSESSMENT SCORE (FRAX) INDICATING GREATER THAN 3% RISK FOR HIP FRACTURE: Primary | ICD-10-CM

## 2025-07-22 DIAGNOSIS — E03.9 HYPOTHYROIDISM (ACQUIRED): ICD-10-CM

## 2025-07-22 DIAGNOSIS — K90.0 CELIAC DISEASE: ICD-10-CM

## 2025-07-22 RX ORDER — SODIUM CHLORIDE 9 MG/ML
1000 INJECTION, SOLUTION INTRAVENOUS PRN
OUTPATIENT
Start: 2025-07-23

## 2025-07-22 RX ORDER — 0.9 % SODIUM CHLORIDE 0.9 %
3 VIAL (ML) INJECTION PRN
OUTPATIENT
Start: 2025-07-23

## 2025-07-22 RX ORDER — SODIUM CHLORIDE 9 MG/ML
INJECTION, SOLUTION INTRAVENOUS CONTINUOUS
OUTPATIENT
Start: 2025-07-23

## 2025-07-22 RX ORDER — METHYLPREDNISOLONE SODIUM SUCCINATE 125 MG/2ML
125 INJECTION, POWDER, LYOPHILIZED, FOR SOLUTION INTRAMUSCULAR; INTRAVENOUS PRN
OUTPATIENT
Start: 2025-07-23

## 2025-07-22 RX ORDER — 0.9 % SODIUM CHLORIDE 0.9 %
VIAL (ML) INJECTION PRN
OUTPATIENT
Start: 2025-07-23

## 2025-07-22 RX ORDER — EPINEPHRINE 1 MG/ML(1)
0.5 AMPUL (ML) INJECTION PRN
OUTPATIENT
Start: 2025-07-23

## 2025-07-22 RX ORDER — LORAZEPAM 2 MG/ML
0.5 INJECTION INTRAMUSCULAR PRN
OUTPATIENT
Start: 2025-07-23

## 2025-07-22 RX ORDER — ZOLEDRONIC ACID 0.05 MG/ML
5 INJECTION, SOLUTION INTRAVENOUS ONCE
OUTPATIENT
Start: 2025-07-23 | End: 2025-07-23

## 2025-07-22 RX ORDER — DIPHENHYDRAMINE HYDROCHLORIDE 50 MG/ML
25 INJECTION, SOLUTION INTRAMUSCULAR; INTRAVENOUS PRN
OUTPATIENT
Start: 2025-07-23

## 2025-07-22 RX ORDER — ONDANSETRON 2 MG/ML
8 INJECTION INTRAMUSCULAR; INTRAVENOUS PRN
OUTPATIENT
Start: 2025-07-23

## 2025-07-22 RX ORDER — ACETAMINOPHEN 325 MG/1
650 TABLET ORAL PRN
OUTPATIENT
Start: 2025-07-23

## 2025-07-22 RX ORDER — ONDANSETRON 8 MG/1
8 TABLET, ORALLY DISINTEGRATING ORAL PRN
OUTPATIENT
Start: 2025-07-23

## 2025-07-22 RX ORDER — ALBUTEROL SULFATE 5 MG/ML
2.5 SOLUTION RESPIRATORY (INHALATION) PRN
OUTPATIENT
Start: 2025-07-23

## 2025-07-22 RX ORDER — ACETAMINOPHEN 325 MG/1
650 TABLET ORAL ONCE
OUTPATIENT
Start: 2025-07-23 | End: 2025-07-23

## 2025-07-22 RX ORDER — 0.9 % SODIUM CHLORIDE 0.9 %
10 VIAL (ML) INJECTION PRN
OUTPATIENT
Start: 2025-07-23

## 2025-07-22 RX ORDER — MEPERIDINE HYDROCHLORIDE 25 MG/ML
25 INJECTION INTRAMUSCULAR; INTRAVENOUS; SUBCUTANEOUS PRN
OUTPATIENT
Start: 2025-07-23

## 2025-07-22 RX ORDER — LORAZEPAM 0.5 MG/1
0.5 TABLET ORAL PRN
OUTPATIENT
Start: 2025-07-23

## 2025-07-24 ENCOUNTER — APPOINTMENT (OUTPATIENT)
Dept: URBAN - METROPOLITAN AREA CLINIC 22 | Facility: CLINIC | Age: 70
Setting detail: DERMATOLOGY
End: 2025-07-24

## 2025-07-24 DIAGNOSIS — Z48.817 ENCOUNTER FOR SURGICAL AFTERCARE FOLLOWING SURGERY ON THE SKIN AND SUBCUTANEOUS TISSUE: ICD-10-CM

## 2025-07-24 PROCEDURE — ? POST-OP WOUND EVALUATION

## 2025-07-24 ASSESSMENT — LOCATION ZONE DERM: LOCATION ZONE: NOSE

## 2025-07-24 ASSESSMENT — LOCATION DETAILED DESCRIPTION DERM: LOCATION DETAILED: NASAL SUPRATIP

## 2025-07-24 ASSESSMENT — LOCATION SIMPLE DESCRIPTION DERM: LOCATION SIMPLE: NOSE

## 2025-07-24 NOTE — PROCEDURE: POST-OP WOUND EVALUATION
Detail Level: Detailed
Add 52859 Cpt? (Important Note: In 2017 The Use Of 37220 Is Being Tracked By Cms To Determine Future Global Period Reimbursement For Global Periods): yes
Quality 355: Unplanned Reoperation Within The 30 Day Postoperative Period: No return to the operating room for a surgical procedure, for complications of the principal operative procedure, within 30 days of the principal operative procedure
Quality 357: Surgical Site Infection (Ssi): No surgical site infection
Wound Evaluated By (Optional): Alejandro Ocasio MD
Wound Diameter In Cm(Optional): 0
Wound Crusting?: clean
Wound Edema?: mild
Wound Color?: pink

## 2025-07-28 ENCOUNTER — HOSPITAL ENCOUNTER (OUTPATIENT)
Dept: LAB | Facility: MEDICAL CENTER | Age: 70
End: 2025-07-28
Payer: MEDICARE

## 2025-07-28 DIAGNOSIS — E06.3 HYPOTHYROIDISM DUE TO HASHIMOTO THYROIDITIS: ICD-10-CM

## 2025-07-28 LAB
ALBUMIN SERPL BCP-MCNC: 4.2 G/DL (ref 3.2–4.9)
ALBUMIN/GLOB SERPL: 1.3 G/DL
ALP SERPL-CCNC: 71 U/L (ref 30–99)
ALT SERPL-CCNC: 33 U/L (ref 2–50)
ANION GAP SERPL CALC-SCNC: 11 MMOL/L (ref 7–16)
AST SERPL-CCNC: 34 U/L (ref 12–45)
BILIRUB SERPL-MCNC: 0.4 MG/DL (ref 0.1–1.5)
BUN SERPL-MCNC: 13 MG/DL (ref 8–22)
CALCIUM ALBUM COR SERPL-MCNC: 9 MG/DL (ref 8.5–10.5)
CALCIUM SERPL-MCNC: 9.2 MG/DL (ref 8.5–10.5)
CHLORIDE SERPL-SCNC: 97 MMOL/L (ref 96–112)
CO2 SERPL-SCNC: 24 MMOL/L (ref 20–33)
CREAT SERPL-MCNC: 0.79 MG/DL (ref 0.5–1.4)
GFR SERPLBLD CREATININE-BSD FMLA CKD-EPI: 81 ML/MIN/1.73 M 2
GLOBULIN SER CALC-MCNC: 3.3 G/DL (ref 1.9–3.5)
GLUCOSE SERPL-MCNC: 92 MG/DL (ref 65–99)
POTASSIUM SERPL-SCNC: 4.9 MMOL/L (ref 3.6–5.5)
PROT SERPL-MCNC: 7.5 G/DL (ref 6–8.2)
SODIUM SERPL-SCNC: 132 MMOL/L (ref 135–145)
T4 FREE SERPL-MCNC: 1.28 NG/DL (ref 0.93–1.7)
TSH SERPL DL<=0.005 MIU/L-ACNC: 9.5 UIU/ML (ref 0.38–5.33)

## 2025-07-28 PROCEDURE — 80053 COMPREHEN METABOLIC PANEL: CPT

## 2025-07-28 PROCEDURE — 84443 ASSAY THYROID STIM HORMONE: CPT

## 2025-07-28 PROCEDURE — 84439 ASSAY OF FREE THYROXINE: CPT

## 2025-07-28 PROCEDURE — 36415 COLL VENOUS BLD VENIPUNCTURE: CPT

## 2025-08-04 ENCOUNTER — HOSPITAL ENCOUNTER (OUTPATIENT)
Facility: MEDICAL CENTER | Age: 70
End: 2025-08-04
Payer: MEDICARE

## 2025-08-04 ENCOUNTER — APPOINTMENT (OUTPATIENT)
Dept: INTERNAL MEDICINE | Facility: OTHER | Age: 70
End: 2025-08-04
Payer: MEDICARE

## 2025-08-04 ENCOUNTER — TELEPHONE (OUTPATIENT)
Dept: INTERNAL MEDICINE | Facility: OTHER | Age: 70
End: 2025-08-04

## 2025-08-04 DIAGNOSIS — G62.9 NEUROPATHY: ICD-10-CM

## 2025-08-04 PROBLEM — R59.0 CERVICAL LYMPHADENOPATHY: Status: RESOLVED | Noted: 2025-08-04 | Resolved: 2025-08-04

## 2025-08-04 PROBLEM — J44.9 COPD (CHRONIC OBSTRUCTIVE PULMONARY DISEASE) (HCC): Status: ACTIVE | Noted: 2025-08-04

## 2025-08-04 PROBLEM — K64.9 HEMORRHOID: Status: RESOLVED | Noted: 2025-08-04 | Resolved: 2025-08-04

## 2025-08-04 PROBLEM — D64.9 ANEMIA: Status: RESOLVED | Noted: 2025-08-04 | Resolved: 2025-08-04

## 2025-08-04 PROBLEM — L73.2 HIDRADENITIS SUPPURATIVA: Status: RESOLVED | Noted: 2025-08-04 | Resolved: 2025-08-04

## 2025-08-04 PROBLEM — F32.A DEPRESSIVE DISORDER: Status: RESOLVED | Noted: 2025-08-04 | Resolved: 2025-08-04

## 2025-08-04 PROBLEM — K31.89 ACUTE GASTRIC VOLVULUS: Status: RESOLVED | Noted: 2025-08-04 | Resolved: 2025-08-04

## 2025-08-04 PROBLEM — L98.9 LESION OF NECK: Status: RESOLVED | Noted: 2025-08-04 | Resolved: 2025-08-04

## 2025-08-04 PROBLEM — J30.9 ALLERGIC RHINITIS: Status: RESOLVED | Noted: 2025-08-04 | Resolved: 2025-08-04

## 2025-08-04 PROBLEM — K90.0 CELIAC DISEASE: Status: ACTIVE | Noted: 2025-08-04

## 2025-08-04 PROBLEM — S82.399A FRACTURE, POSTERIOR MALLEOLUS: Status: RESOLVED | Noted: 2025-08-04 | Resolved: 2025-08-04

## 2025-08-04 PROBLEM — R05.9 COUGH: Status: RESOLVED | Noted: 2025-08-04 | Resolved: 2025-08-04

## 2025-08-04 PROBLEM — K76.0 FATTY LIVER: Status: RESOLVED | Noted: 2025-08-04 | Resolved: 2025-08-04

## 2025-08-04 PROBLEM — R49.9 CHANGE OF VOICE: Status: RESOLVED | Noted: 2025-08-04 | Resolved: 2025-08-04

## 2025-08-04 PROBLEM — R79.89 ABNORMAL LIVER FUNCTION TESTS: Status: RESOLVED | Noted: 2025-08-04 | Resolved: 2025-08-04

## 2025-08-04 PROCEDURE — 80307 DRUG TEST PRSMV CHEM ANLYZR: CPT

## 2025-08-05 ENCOUNTER — TELEPHONE (OUTPATIENT)
Dept: INTERNAL MEDICINE | Facility: OTHER | Age: 70
End: 2025-08-05
Payer: MEDICARE

## 2025-08-05 DIAGNOSIS — F11.90 CHRONIC, CONTINUOUS USE OF OPIOIDS: Primary | ICD-10-CM

## 2025-08-05 DIAGNOSIS — Z79.899 CONTROLLED SUBSTANCE AGREEMENT SIGNED: ICD-10-CM

## 2025-08-05 DIAGNOSIS — G62.9 NEUROPATHY: ICD-10-CM

## 2025-08-05 DIAGNOSIS — Z79.899 LONG-TERM CURRENT USE OF BENZODIAZEPINE: ICD-10-CM

## 2025-08-06 ENCOUNTER — TELEPHONE (OUTPATIENT)
Dept: HEALTH INFORMATION MANAGEMENT | Facility: OTHER | Age: 70
End: 2025-08-06
Payer: MEDICARE

## 2025-08-12 DIAGNOSIS — F11.90 CHRONIC, CONTINUOUS USE OF OPIOIDS: ICD-10-CM

## 2025-08-12 DIAGNOSIS — Z79.899 CONTROLLED SUBSTANCE AGREEMENT SIGNED: ICD-10-CM

## 2025-08-12 DIAGNOSIS — G62.9 NEUROPATHY: ICD-10-CM

## 2025-08-12 DIAGNOSIS — Z79.899 LONG-TERM CURRENT USE OF BENZODIAZEPINE: ICD-10-CM

## 2025-08-13 LAB
AMPHET CTO UR CFM-MCNC: NEGATIVE NG/ML
BARBITURATES CTO UR CFM-MCNC: NEGATIVE NG/ML
BENZODIAZ CTO UR CFM-MCNC: NEGATIVE NG/ML
CANNABINOIDS CTO UR CFM-MCNC: NEGATIVE NG/ML
COCAINE CTO UR CFM-MCNC: NEGATIVE NG/ML
CREAT UR-MCNC: 28.1 MG/DL (ref 20–400)
DRUG COMMENT 753798: NORMAL
METHADONE CTO UR CFM-MCNC: NEGATIVE NG/ML
OPIATES CTO UR CFM-MCNC: NEGATIVE NG/ML
PCP CTO UR CFM-MCNC: NEGATIVE NG/ML
PROPOXYPH CTO UR CFM-MCNC: NEGATIVE NG/ML

## 2025-08-20 ENCOUNTER — APPOINTMENT (OUTPATIENT)
Dept: URBAN - METROPOLITAN AREA CLINIC 22 | Facility: CLINIC | Age: 70
Setting detail: DERMATOLOGY
End: 2025-08-20

## 2025-08-20 DIAGNOSIS — Z48.817 ENCOUNTER FOR SURGICAL AFTERCARE FOLLOWING SURGERY ON THE SKIN AND SUBCUTANEOUS TISSUE: ICD-10-CM

## 2025-08-20 PROCEDURE — ? POST-OP WOUND EVALUATION

## 2025-08-20 ASSESSMENT — LOCATION SIMPLE DESCRIPTION DERM: LOCATION SIMPLE: NOSE

## 2025-08-20 ASSESSMENT — LOCATION DETAILED DESCRIPTION DERM: LOCATION DETAILED: NASAL SUPRATIP

## 2025-08-20 ASSESSMENT — LOCATION ZONE DERM: LOCATION ZONE: NOSE

## 2025-08-21 ENCOUNTER — TELEPHONE (OUTPATIENT)
Dept: INTERNAL MEDICINE | Facility: OTHER | Age: 70
End: 2025-08-21
Payer: MEDICARE

## 2025-08-21 DIAGNOSIS — G62.9 NEUROPATHY: ICD-10-CM

## 2025-08-21 RX ORDER — PREGABALIN 50 MG/1
50 CAPSULE ORAL 2 TIMES DAILY
Qty: 60 CAPSULE | Refills: 2 | Status: SHIPPED | OUTPATIENT
Start: 2025-08-21 | End: 2025-11-19

## 2025-08-25 ENCOUNTER — OUTPATIENT INFUSION SERVICES (OUTPATIENT)
Dept: ONCOLOGY | Facility: MEDICAL CENTER | Age: 70
End: 2025-08-25
Attending: STUDENT IN AN ORGANIZED HEALTH CARE EDUCATION/TRAINING PROGRAM
Payer: MEDICARE

## 2025-08-25 VITALS
SYSTOLIC BLOOD PRESSURE: 128 MMHG | DIASTOLIC BLOOD PRESSURE: 84 MMHG | HEIGHT: 66 IN | HEART RATE: 78 BPM | RESPIRATION RATE: 18 BRPM | BODY MASS INDEX: 22.68 KG/M2 | TEMPERATURE: 97.1 F | OXYGEN SATURATION: 95 % | WEIGHT: 141.09 LBS

## 2025-08-25 DIAGNOSIS — Z91.89 FRACTURE RISK ASSESSMENT SCORE (FRAX) INDICATING GREATER THAN 3% RISK FOR HIP FRACTURE: Primary | ICD-10-CM

## 2025-08-25 LAB
CA-I BLD ISE-SCNC: 1.14 MMOL/L (ref 1.1–1.3)
CREAT BLD-MCNC: 0.6 MG/DL (ref 0.5–1.4)

## 2025-08-25 PROCEDURE — 82330 ASSAY OF CALCIUM: CPT

## 2025-08-25 PROCEDURE — 700102 HCHG RX REV CODE 250 W/ 637 OVERRIDE(OP): Performed by: STUDENT IN AN ORGANIZED HEALTH CARE EDUCATION/TRAINING PROGRAM

## 2025-08-25 PROCEDURE — A9270 NON-COVERED ITEM OR SERVICE: HCPCS | Performed by: STUDENT IN AN ORGANIZED HEALTH CARE EDUCATION/TRAINING PROGRAM

## 2025-08-25 PROCEDURE — 82565 ASSAY OF CREATININE: CPT

## 2025-08-25 PROCEDURE — 96365 THER/PROPH/DIAG IV INF INIT: CPT

## 2025-08-25 PROCEDURE — 700111 HCHG RX REV CODE 636 W/ 250 OVERRIDE (IP): Performed by: STUDENT IN AN ORGANIZED HEALTH CARE EDUCATION/TRAINING PROGRAM

## 2025-08-25 RX ORDER — METHYLPREDNISOLONE SODIUM SUCCINATE 125 MG/2ML
125 INJECTION, POWDER, LYOPHILIZED, FOR SOLUTION INTRAMUSCULAR; INTRAVENOUS PRN
OUTPATIENT
Start: 2026-08-26

## 2025-08-25 RX ORDER — ZOLEDRONIC ACID 0.05 MG/ML
5 INJECTION, SOLUTION INTRAVENOUS ONCE
Status: COMPLETED | OUTPATIENT
Start: 2025-08-25 | End: 2025-08-25

## 2025-08-25 RX ORDER — 0.9 % SODIUM CHLORIDE 0.9 %
10 VIAL (ML) INJECTION PRN
OUTPATIENT
Start: 2026-08-26

## 2025-08-25 RX ORDER — MEPERIDINE HYDROCHLORIDE 25 MG/ML
25 INJECTION INTRAMUSCULAR; INTRAVENOUS; SUBCUTANEOUS PRN
OUTPATIENT
Start: 2026-08-26

## 2025-08-25 RX ORDER — ZOLEDRONIC ACID 0.05 MG/ML
5 INJECTION, SOLUTION INTRAVENOUS ONCE
OUTPATIENT
Start: 2026-08-26 | End: 2026-08-26

## 2025-08-25 RX ORDER — 0.9 % SODIUM CHLORIDE 0.9 %
3 VIAL (ML) INJECTION PRN
OUTPATIENT
Start: 2026-08-26

## 2025-08-25 RX ORDER — 0.9 % SODIUM CHLORIDE 0.9 %
VIAL (ML) INJECTION PRN
OUTPATIENT
Start: 2026-08-26

## 2025-08-25 RX ORDER — VITAMIN B COMPLEX
2000 TABLET ORAL DAILY
COMMUNITY

## 2025-08-25 RX ORDER — SODIUM CHLORIDE 9 MG/ML
1000 INJECTION, SOLUTION INTRAVENOUS PRN
OUTPATIENT
Start: 2026-08-26

## 2025-08-25 RX ORDER — ACETAMINOPHEN 325 MG/1
650 TABLET ORAL ONCE
Status: COMPLETED | OUTPATIENT
Start: 2025-08-25 | End: 2025-08-25

## 2025-08-25 RX ORDER — ONDANSETRON 2 MG/ML
8 INJECTION INTRAMUSCULAR; INTRAVENOUS PRN
OUTPATIENT
Start: 2026-08-26

## 2025-08-25 RX ORDER — LORAZEPAM 2 MG/ML
0.5 INJECTION INTRAMUSCULAR PRN
OUTPATIENT
Start: 2026-08-26

## 2025-08-25 RX ORDER — ACETAMINOPHEN 325 MG/1
650 TABLET ORAL PRN
OUTPATIENT
Start: 2026-08-26

## 2025-08-25 RX ORDER — ONDANSETRON 8 MG/1
8 TABLET, ORALLY DISINTEGRATING ORAL PRN
OUTPATIENT
Start: 2026-08-26

## 2025-08-25 RX ORDER — ACETAMINOPHEN 325 MG/1
650 TABLET ORAL ONCE
OUTPATIENT
Start: 2026-08-26 | End: 2026-08-26

## 2025-08-25 RX ORDER — ALBUTEROL SULFATE 5 MG/ML
2.5 SOLUTION RESPIRATORY (INHALATION) PRN
OUTPATIENT
Start: 2026-08-26

## 2025-08-25 RX ORDER — DIPHENHYDRAMINE HYDROCHLORIDE 50 MG/ML
25 INJECTION, SOLUTION INTRAMUSCULAR; INTRAVENOUS PRN
OUTPATIENT
Start: 2026-08-26

## 2025-08-25 RX ORDER — EPINEPHRINE 1 MG/ML(1)
0.5 AMPUL (ML) INJECTION PRN
OUTPATIENT
Start: 2026-08-26

## 2025-08-25 RX ORDER — LORAZEPAM 1 MG/1
0.5 TABLET ORAL PRN
OUTPATIENT
Start: 2026-08-26

## 2025-08-25 RX ORDER — SODIUM CHLORIDE 9 MG/ML
INJECTION, SOLUTION INTRAVENOUS CONTINUOUS
OUTPATIENT
Start: 2026-08-26

## 2025-08-25 RX ADMIN — ACETAMINOPHEN 650 MG: 325 TABLET ORAL at 10:52

## 2025-08-25 RX ADMIN — ZOLEDRONIC ACID 5 MG: 5 INJECTION, SOLUTION INTRAVENOUS at 10:59

## 2025-09-11 ENCOUNTER — APPOINTMENT (OUTPATIENT)
Dept: RADIOLOGY | Facility: MEDICAL CENTER | Age: 70
End: 2025-09-11
Payer: MEDICARE